# Patient Record
Sex: FEMALE | Race: WHITE | NOT HISPANIC OR LATINO | Employment: UNEMPLOYED | URBAN - NONMETROPOLITAN AREA
[De-identification: names, ages, dates, MRNs, and addresses within clinical notes are randomized per-mention and may not be internally consistent; named-entity substitution may affect disease eponyms.]

---

## 2017-02-06 ENCOUNTER — OFFICE VISIT (OUTPATIENT)
Dept: ENDOCRINOLOGY | Facility: CLINIC | Age: 47
End: 2017-02-06

## 2017-02-06 ENCOUNTER — APPOINTMENT (OUTPATIENT)
Dept: LAB | Facility: HOSPITAL | Age: 47
End: 2017-02-06

## 2017-02-06 VITALS
SYSTOLIC BLOOD PRESSURE: 164 MMHG | HEART RATE: 73 BPM | WEIGHT: 272 LBS | BODY MASS INDEX: 42.69 KG/M2 | HEIGHT: 67 IN | DIASTOLIC BLOOD PRESSURE: 98 MMHG

## 2017-02-06 DIAGNOSIS — E11.9 TYPE 2 DIABETES MELLITUS WITHOUT COMPLICATION, WITHOUT LONG-TERM CURRENT USE OF INSULIN (HCC): Primary | ICD-10-CM

## 2017-02-06 DIAGNOSIS — E05.00 GRAVES' DISEASE: ICD-10-CM

## 2017-02-06 DIAGNOSIS — I10 ESSENTIAL HYPERTENSION: ICD-10-CM

## 2017-02-06 LAB
ALBUMIN SERPL-MCNC: 4.3 G/DL (ref 3.4–4.8)
ALBUMIN/GLOB SERPL: 1 G/DL (ref 1.1–1.8)
ALP SERPL-CCNC: 133 U/L (ref 38–126)
ALT SERPL W P-5'-P-CCNC: 27 U/L (ref 9–52)
ANION GAP SERPL CALCULATED.3IONS-SCNC: 13 MMOL/L (ref 5–15)
AST SERPL-CCNC: 34 U/L (ref 14–36)
BASOPHILS # BLD AUTO: 0.09 10*3/MM3 (ref 0–0.2)
BASOPHILS NFR BLD AUTO: 1.6 % (ref 0–2)
BILIRUB SERPL-MCNC: 0.8 MG/DL (ref 0.2–1.3)
BUN BLD-MCNC: 8 MG/DL (ref 7–21)
BUN/CREAT SERPL: 14.5 (ref 7–25)
CALCIUM SPEC-SCNC: 9.2 MG/DL (ref 8.4–10.2)
CHLORIDE SERPL-SCNC: 98 MMOL/L (ref 95–110)
CO2 SERPL-SCNC: 30 MMOL/L (ref 22–31)
CREAT BLD-MCNC: 0.55 MG/DL (ref 0.5–1)
DEPRECATED RDW RBC AUTO: 43.3 FL (ref 36.4–46.3)
EOSINOPHIL # BLD AUTO: 0.1 10*3/MM3 (ref 0–0.7)
EOSINOPHIL NFR BLD AUTO: 1.8 % (ref 0–7)
ERYTHROCYTE [DISTWIDTH] IN BLOOD BY AUTOMATED COUNT: 14.2 % (ref 11.5–14.5)
GFR SERPL CREATININE-BSD FRML MDRD: 118 ML/MIN/1.73 (ref 58–135)
GLOBULIN UR ELPH-MCNC: 4.2 GM/DL (ref 2.3–3.5)
GLUCOSE BLD-MCNC: 199 MG/DL (ref 60–100)
HBA1C MFR BLD: 6.96 % (ref 4–5.6)
HCT VFR BLD AUTO: 40.7 % (ref 35–45)
HGB BLD-MCNC: 13.5 G/DL (ref 12–15.5)
IMM GRANULOCYTES # BLD: 0.01 10*3/MM3 (ref 0–0.02)
IMM GRANULOCYTES NFR BLD: 0.2 % (ref 0–0.5)
LYMPHOCYTES # BLD AUTO: 1.83 10*3/MM3 (ref 0.6–4.2)
LYMPHOCYTES NFR BLD AUTO: 32.1 % (ref 10–50)
MCH RBC QN AUTO: 28.2 PG (ref 26.5–34)
MCHC RBC AUTO-ENTMCNC: 33.2 G/DL (ref 31.4–36)
MCV RBC AUTO: 85.1 FL (ref 80–98)
MONOCYTES # BLD AUTO: 0.41 10*3/MM3 (ref 0–0.9)
MONOCYTES NFR BLD AUTO: 7.2 % (ref 0–12)
NEUTROPHILS # BLD AUTO: 3.26 10*3/MM3 (ref 2–8.6)
NEUTROPHILS NFR BLD AUTO: 57.1 % (ref 37–80)
PLATELET # BLD AUTO: 155 10*3/MM3 (ref 150–450)
PMV BLD AUTO: 10.4 FL (ref 8–12)
POTASSIUM BLD-SCNC: 4.5 MMOL/L (ref 3.5–5.1)
PROT SERPL-MCNC: 8.5 G/DL (ref 6.3–8.6)
RBC # BLD AUTO: 4.78 10*6/MM3 (ref 3.77–5.16)
SODIUM BLD-SCNC: 141 MMOL/L (ref 137–145)
T3 SERPL-MCNC: 207 NG/DL (ref 97–169)
T4 FREE SERPL-MCNC: 0.65 NG/DL (ref 0.78–2.19)
TSH SERPL DL<=0.05 MIU/L-ACNC: 0.02 MIU/ML (ref 0.46–4.68)
WBC NRBC COR # BLD: 5.7 10*3/MM3 (ref 3.2–9.8)

## 2017-02-06 PROCEDURE — 80050 GENERAL HEALTH PANEL: CPT | Performed by: NURSE PRACTITIONER

## 2017-02-06 PROCEDURE — 36415 COLL VENOUS BLD VENIPUNCTURE: CPT | Performed by: NURSE PRACTITIONER

## 2017-02-06 PROCEDURE — 83036 HEMOGLOBIN GLYCOSYLATED A1C: CPT | Performed by: NURSE PRACTITIONER

## 2017-02-06 PROCEDURE — 84439 ASSAY OF FREE THYROXINE: CPT | Performed by: NURSE PRACTITIONER

## 2017-02-06 PROCEDURE — 99214 OFFICE O/P EST MOD 30 MIN: CPT | Performed by: NURSE PRACTITIONER

## 2017-02-06 PROCEDURE — 84480 ASSAY TRIIODOTHYRONINE (T3): CPT | Performed by: NURSE PRACTITIONER

## 2017-02-06 NOTE — PROGRESS NOTES
Subjective    Christen Casillas is a 47 y.o. female. she is here today for follow-up.    History of Present Illness     Thyroid Problem            Hyperthyroidism from Graves' Disease      Duration diagnosed in June 2016        Timing - improving     Location-she has an asymmetric right more than left sided goiter board thyroid uptake and scan shows diffuse 60% uptake. This was  Reviewed by . . There is no thyroid ultrasound      Severity - at this point to mild     Complications - none        Current symptoms/problems  initially weight loss, fatigue, heat intolerance, increased bowel movements, tachycardia but now all improved     She also has goiter right, more than left. She states this has decreased in size after starting PTU     Alleviating Factors: Compliance with PTU at a dose of 100 mg 3 times daily and metoprolol at a dose of 25 mg twice daily.     Side Effects developed respiratory difficulties with Tapazole        ---     Social diagnoses of diabetes, new diagnosis June 2016 June 2016    HgbA1c 6.8%    Now on metformin BId     She does not know dosage            The following portions of the patient's history were reviewed and updated as appropriate:   Past Medical History   Diagnosis Date   • Graves disease 10/24/2016   • Hypertension 10/24/2016   • Type 2 diabetes mellitus without complication, without long-term current use of insulin 10/24/2016     History reviewed. No pertinent past surgical history.  Family History   Problem Relation Age of Onset   • Hypothyroidism Mother      OB History     No data available        Current Outpatient Prescriptions   Medication Sig Dispense Refill   • FLUoxetine (PROzac) 10 MG tablet      • gabapentin (NEURONTIN) 300 MG capsule      • lisinopril (PRINIVIL,ZESTRIL) 40 MG tablet      • metoprolol tartrate (LOPRESSOR) 25 MG tablet Take 2 tablets by mouth 2 (Two) Times a Day. 120 tablet 11   • ondansetron (ZOFRAN) 4 MG tablet      • propylthiouracil  (PTU) 50 MG tablet        No current facility-administered medications for this visit.      Allergies   Allergen Reactions   • Tapazole [Methimazole] Shortness Of Breath   • Aspirin Hives     also shortness of breath       Social History     Social History   • Marital status:      Spouse name: N/A   • Number of children: N/A   • Years of education: N/A     Social History Main Topics   • Smoking status: Former Smoker   • Smokeless tobacco: None   • Alcohol use None   • Drug use: None   • Sexual activity: Not Asked     Other Topics Concern   • None     Social History Narrative       Review of Systems  Review of Systems   Constitutional: Positive for fatigue. Negative for activity change, appetite change, chills, diaphoresis, fever and unexpected weight change.   HENT: Negative for congestion, dental problem, drooling, ear discharge, ear pain, facial swelling, sneezing, sore throat, tinnitus, trouble swallowing and voice change.    Eyes: Negative for photophobia, pain, discharge, redness, itching and visual disturbance.   Respiratory: Negative for apnea, cough, choking, chest tightness and shortness of breath.    Cardiovascular: Negative for chest pain, palpitations and leg swelling.   Gastrointestinal: Negative for abdominal distention, abdominal pain, constipation, diarrhea, nausea and vomiting.   Endocrine: Negative for cold intolerance, heat intolerance, polydipsia, polyphagia and polyuria.   Genitourinary: Negative for difficulty urinating, dysuria, frequency, hematuria and urgency.   Musculoskeletal: Negative for arthralgias, back pain, gait problem, joint swelling, myalgias, neck pain and neck stiffness.   Skin: Negative for color change, pallor, rash and wound.   Allergic/Immunologic: Negative for environmental allergies, food allergies and immunocompromised state.   Neurological: Negative for dizziness, tremors, facial asymmetry, weakness, light-headedness, numbness and headaches.   Hematological:  "Negative for adenopathy. Does not bruise/bleed easily.   Psychiatric/Behavioral: Negative for agitation, behavioral problems, confusion, decreased concentration, dysphoric mood and sleep disturbance.        Objective      Visit Vitals   • /98 (BP Location: Left arm, Patient Position: Sitting, Cuff Size: Adult)   • Pulse 73   • Ht 67\" (170.2 cm)   • Wt 272 lb (123 kg)   • BMI 42.6 kg/m2     Physical Exam   Constitutional: She is oriented to person, place, and time. She appears well-developed and well-nourished. No distress.   HENT:   Head: Normocephalic and atraumatic.   Right Ear: External ear normal.   Left Ear: External ear normal.   Nose: Nose normal.   Eyes: Conjunctivae and EOM are normal. Pupils are equal, round, and reactive to light.   Neck: Normal range of motion. Neck supple. No tracheal deviation present. Thyromegaly present.   assymetric goiter right greater than left   Cardiovascular: Normal rate, regular rhythm and normal heart sounds.    No murmur heard.  Pulmonary/Chest: Effort normal and breath sounds normal. No respiratory distress. She has no wheezes.   Abdominal: Soft. Bowel sounds are normal. There is no tenderness. There is no rebound and no guarding.   Musculoskeletal: Normal range of motion. She exhibits no edema, tenderness or deformity.   Neurological: She is alert and oriented to person, place, and time. No cranial nerve deficit.   Skin: Skin is warm and dry. No rash noted.   Psychiatric: She has a normal mood and affect. Her behavior is normal. Judgment and thought content normal.       Lab Review  GLUCOSE   Date Value   02/06/2017 199 mg/dL (H)   10/24/2016 133 mg/dl (H)   09/19/2016 135 mg/dl (H)   05/26/2016 143 mg/dl (H)     SODIUM (mmol/L)   Date Value   02/06/2017 141   10/24/2016 138   09/19/2016 140   05/26/2016 141     POTASSIUM (mmol/L)   Date Value   02/06/2017 4.5   10/24/2016 4.7   09/19/2016 3.8   05/26/2016 4.7     CHLORIDE (mmol/L)   Date Value   02/06/2017 98 "   10/24/2016 97   09/19/2016 100   05/26/2016 100     CO2 (mmol/L)   Date Value   02/06/2017 30.0   10/24/2016 31   09/19/2016 29   05/26/2016 23     BUN   Date Value   02/06/2017 8 mg/dL   10/24/2016 8 mg/dl   09/19/2016 11 mg/dl   05/26/2016 13 mg/dl     CREATININE   Date Value   02/06/2017 0.55 mg/dL   10/24/2016 0.7 mg/dl   09/19/2016 0.6 mg/dl   05/26/2016 0.4 mg/dl (L)     HEMOGLOBIN A1C   Date Value   02/06/2017 6.96 % (H)   06/07/2016 6.8 %TotHgb (H)     TRIGLYCERIDES (mg/dl)   Date Value   06/07/2016 128       Assessment/Plan      1. Type 2 diabetes mellitus without complication, without long-term current use of insulin    2. Graves' disease    3. Essential hypertension    .    Medications prescribed:  Outpatient Encounter Prescriptions as of 2/6/2017   Medication Sig Dispense Refill   • FLUoxetine (PROzac) 10 MG tablet      • gabapentin (NEURONTIN) 300 MG capsule      • lisinopril (PRINIVIL,ZESTRIL) 40 MG tablet      • metoprolol tartrate (LOPRESSOR) 25 MG tablet Take 2 tablets by mouth 2 (Two) Times a Day. 120 tablet 11   • ondansetron (ZOFRAN) 4 MG tablet      • propylthiouracil (PTU) 50 MG tablet        No facility-administered encounter medications on file as of 2/6/2017.        Orders placed during this encounter include:  Orders Placed This Encounter   Procedures   • Comprehensive Metabolic Panel   • TSH   • T4, Free   • T3   • Hemoglobin A1c   • CBC Auto Differential                 Lipid Management  June 2016     Sum Total LDL-Chol 0 - 129 mg/dl 69   Tg nl      Reevaluate with nl Thyroid                       Blood Pressure Management:   Nl on lisinopril  Bronchospasm w metoprolol         Microvascular Complication Monitoring:     Neuropathy - on Neurontin 300 mg      --     Immunizations:           Preventive Care:      Non smoker     Weight Related:   Advised to consume 500 calories less per day  Exercise 30 min daily      Bone Health           Lab Results   Component Value Date     CALCIUM 9.6  09/19/2016         Thyroid Health        Lab Results   Component Value Date     TSH <0.01 (L) 08/16/2016      Graves Disease        Component  Latest Ref Rng 6/7/2016 8/16/2016   TSH Baseline  0.46 - 4.68 uIU/ml <0.01 (L) <0.01 (L)   Free T4  0.78 - 2.19 ng/dl   5.69 (H)   T3, Total  97 - 169 ng/dl   781 (H)   Thyrotropin Receptor Antibody  0.00 - 1.75 IU/L   11.71 (H)       dx is Graves' disease      PTU 2 tabs three times daily     Labs from Oct. 2016    TSH - <0.01  Free T4- 1.01  T3- 209    Decreased PTU to 2 tablets BID      Continue metopolol 25 mg twice daily     Need new labs today and will call      Schedule for Radioactive Iodine. After Radioactive Iodine is given there is a transient worsening phase so 5 days after CAPELLAN restart PTU for about 2 weeks then stop      We will check levels 4 weeks after CAPELLAN ,  will leave a lab order     Don't stop metoprolol unless heart rate drops less than 60   On the contrary if heart rate speeds up to more than 100 while resting - double it .      will make appt w Dr. Vela and with us in about 8 weeks.     Other Diabetes Related Aspects         No results found for: [id number]            Glycemic Management    Now on metformin ---- start B12 in the formal multivitamins daily     Has neuropathy and this could be related to either thyroid, diabetes or b12 Deficiency.     She is taking Neurontin              4. Follow-up: Return in about 3 months (around 5/6/2017) for Recheck.

## 2017-02-07 ENCOUNTER — TELEPHONE (OUTPATIENT)
Dept: ENDOCRINOLOGY | Facility: CLINIC | Age: 47
End: 2017-02-07

## 2017-02-07 RX ORDER — PROPYLTHIOURACIL 50 MG/1
50 TABLET ORAL 3 TIMES DAILY
Qty: 90 TABLET | Refills: 5 | Status: SHIPPED | OUTPATIENT
Start: 2017-02-07 | End: 2017-03-29 | Stop reason: ALTCHOICE

## 2017-02-07 NOTE — TELEPHONE ENCOUNTER
----- Message from NYLA Gibbs sent at 2/7/2017  1:46 PM CST -----  Call patient with result- let her know still hyperthyroid but the free T4 is now low so ask her to take the PTU 1 tablet 3 times a day

## 2017-02-13 ENCOUNTER — HOSPITAL ENCOUNTER (OUTPATIENT)
Dept: RADIATION ONCOLOGY | Facility: HOSPITAL | Age: 47
Setting detail: RADIATION/ONCOLOGY SERIES
End: 2017-02-13

## 2017-02-13 ENCOUNTER — OFFICE VISIT (OUTPATIENT)
Dept: RADIATION ONCOLOGY | Facility: HOSPITAL | Age: 47
End: 2017-02-13

## 2017-02-13 VITALS
TEMPERATURE: 97 F | BODY MASS INDEX: 42.31 KG/M2 | OXYGEN SATURATION: 95 % | RESPIRATION RATE: 20 BRPM | DIASTOLIC BLOOD PRESSURE: 72 MMHG | SYSTOLIC BLOOD PRESSURE: 167 MMHG | HEART RATE: 68 BPM | WEIGHT: 269.6 LBS | HEIGHT: 67 IN

## 2017-02-13 DIAGNOSIS — E05.00 GRAVES' DISEASE: Primary | ICD-10-CM

## 2017-02-13 PROCEDURE — G0463 HOSPITAL OUTPT CLINIC VISIT: HCPCS | Performed by: RADIOLOGY

## 2017-02-13 PROCEDURE — 99204 OFFICE O/P NEW MOD 45 MIN: CPT | Performed by: RADIOLOGY

## 2017-02-13 NOTE — PROGRESS NOTES
New Patient Visit       Patient: Christen Casillas   YOB: 1970   Medical Record Number: [id number]   Date of Visit  February 13, 2017   Primary Diagnosis:  Hyperthyroidism secondary to Graves’ disease [E05.90]                                              History of Present Illness: Ms. Christen Casillas is a 47-year-old white female with a history of hyperthyroidism secondary to Graves’ disease.  Ms. Casillas initially presented to the [facility] on 5/26/16 complaining of a 3 week history of nausea, dizziness, increased anxiety, and a recent 35 pound weight loss.  She also noted that she had recently developed a nodule in her right neck.  She was referred to the [facility].  No specific cause for her symptoms was identified and she was scheduled to follow-up with the [facility], which she did on 6/7/16.  Blood work at that time revealed a TSH of <0.01.  A thyroid uptake and scan on 6/30/16 revealed homogeneous uptake of radioactive tracer within the thyroid gland, with no hot or cold nodules identified, diffuse enlargement of both lobes of the thyroid and isthmus (significantly worse on the right) and 24-hour uptake of 61.2% (normal 10-30%), all consistent with hyperthyroidism.  The patient was subsequently started on methimazole and metoprolol.  Repeat blood work on 8/16/16 revealed a TSH of <0.01, a T3 of 781 ng/dl (97-1 69) and a T4 of 5.69 ng/dl (0.78-2.19).  The patient was unable to tolerate methimazole due to respiratory distress and was switched to propylthiouracil (PTU).  The patient was referred to Dr. Messina and was seen in his clinic on 10/24/16.  At that time, labwork revealed a TSH of <0.01, a T3 of 209, and a T4 of 1.01.  Her thyroid stimulating immunoglobulin was 213 (0-139) and her thyroid peroxidase was 149 (0-34), both indicative of Graves’ disease.  Her PTU was changed from 2 tablets 3 times a day to 2 tablets twice a day and she was referred for radioactive iodine  ablation.  She never received that appointment, however, and was seen by NYLA Mondragon, in follow-up on 2/6/17.  Blood work revealed a TSH of 0.20, a T3 of 207.0, and a T4 of 0.65.  Her PTU was decreased to 1 tablet 3 times a day and she has been referred to our clinic to discuss radioactive iodine ablation of her thyroid.    (Old medical records were requested, reviewed, and summarized in the HPI)    Review of Systems   Constitutional: Positive for fatigue.   Skin:        Hair loss       The remainder of her review of systems is otherwise negative.    Past Medical History   Diagnosis Date   • Graves disease 10/24/2016   • Hypertension 10/24/2016   • Type 2 diabetes mellitus without complication, without long-term current use of insulin 10/24/2016        Past Surgical History   Procedure Laterality Date   • Exploratory laparotomy     • Tympanoplasty Left       Family History   Problem Relation Age of Onset   • Hypothyroidism Mother    • Skin cancer Mother    • Stomach cancer Brother    • Stomach cancer Maternal Aunt    • Breast cancer Paternal Grandmother         Social History     Social History   • Marital status:      Spouse name: Timbo   • Number of children: 0   • Years of education: 12     Occupational History   • food prep      Social History Main Topics   • Smoking status: Former Smoker     Packs/day: 0.50     Years: 31.00     Types: Cigarettes     Quit date: 09/2011   • Smokeless tobacco: Never Used   • Alcohol use No   • Drug use: No   • Sexual activity: No     Other Topics Concern   • Not on file     Social History Narrative        Honey bee treatment [bee venom]; Tapazole [methimazole]; and Aspirin   Prior to Admission medications    Medication Sig Start Date End Date Taking? Authorizing Provider   FLUoxetine (PROzac) 10 MG tablet  10/11/16  Yes Historical Provider, MD   gabapentin (NEURONTIN) 300 MG capsule  10/14/16  Yes Historical Provider, MD   lisinopril (PRINIVIL,ZESTRIL) 40 MG tablet   "10/11/16  Yes Historical Provider, MD   metoprolol tartrate (LOPRESSOR) 25 MG tablet Take 2 tablets by mouth 2 (Two) Times a Day. 10/24/16  Yes Gabino Martinez MD   ondansetron (ZOFRAN) 4 MG tablet  10/11/16  Yes Historical Provider, MD   propylthiouracil (PTU) 50 MG tablet  10/11/16  Yes Historical Provider, MD   propylthiouracil (PTU) 50 MG tablet Take 1 tablet by mouth 3 (Three) Times a Day. 2/7/17 2/7/18 Yes NYLA Gibbs      Pain: (on a scale of 0-10)     Pain Score    02/13/17 1054   PainSc: 0-No pain       Quality of Life:  100 - Full Activity  Advanced Care Plan: N     Physical Examination:  Vitals:     Vitals:    02/13/17 1054   BP: 167/72   Pulse: 68   Resp: 20   Temp: 97 °F (36.1 °C)   SpO2: 95%       Height: 67\" (170.2 cm)  Weight: Weight: 269 lb 9.6 oz (122 kg)   Body mass index is 42.23 kg/(m^2).      Constitutional: The patient is a well-developed, well-nourished white female in no acute distress.  Alert and oriented ×3.  Eyes: PERRLA.  EOMI.  ENMT:  Ears and nose WNL.  No lesions noted in the oral cavity or oropharynx. Poor dentition is noted.  A large goiter is noted in the right neck.    Lymphatics: No cervical, supraclavicular, or  Axillary lymphadenopathy is palpated.  CV: Regular rate and rhythm.  No murmurs, rubs, or gallops are appreciated.  Respiratory: Lungs clear to auscultation.  Breath sounds equal bilaterally.  GI: Abdomen soft, nontender, nondistended, with no hepatosplenomegaly or masses palpated.  Extremities: No clubbing, cyanosis, or edema.  Neurologic: Cranial nerves II through XII are grossly intact, with no focal neurological deficits noted on exam.  Psychiatric: Alert and oriented x3. Normal affect, with no anxiety or depression noted.    Radiographs  thyroid uptake and scan as discussed in the HPI.    Pathology: No biopsy performed  Labs:     Date TSH  (uIU/ml) T3  ( ng/dl) T4  (0.78-2.19 ng/dl)   6/7/2016 <0.01     8/16/2016 <0.01 781 5.69 "   10/24/2016 <0.01 209 1.01   2/6/2017 0.2 207 0.65        WBC   Date Value Ref Range Status   02/06/2017 5.70 3.20 - 9.80 10*3/mm3 Final   10/24/2016 6.9 3.2 - 9.8 x1000/uL Final     HEMOGLOBIN   Date Value Ref Range Status   02/06/2017 13.5 12.0 - 15.5 g/dL Final   10/24/2016 13.3 12.0 - 15.5 gm/dl Final     HEMATOCRIT   Date Value Ref Range Status   02/06/2017 40.7 35.0 - 45.0 % Final   10/24/2016 42.0 35.0 - 45.0 % Final     PLATELETS   Date Value Ref Range Status   02/06/2017 155 150 - 450 10*3/mm3 Final   10/24/2016 146 (L) 150 - 450 x1000/mm3 Final        ASSESSMENT/PLAN: Ms. Christen Casillas is a 47-year-old white female with a history of hyperthyroidism secondary to Graves’ disease.  Treatment options were discussed with the patient and her mother-in-law, including medical management, surgery, and radioactive iodine ablation.  I feel that she could benefit from a course of I-131 ablation of the thyroid to help with control of her disease.  The risks, benefits, and rationale for the treatment of hyperthyroidism with I-131 were discussed in detail.  The patient and her mother-in-law voice understanding and agree to proceed with therapy.  We will schedule I-131 ablation in the near future and coordinate follow-up with Dr. Messina.      Sincerely,    Rui Vela MD  Radiation Oncology    Electronically signed byKatelin Walls RN 2/13/2017 11:03 AM     Cc:  Dr. Siddharth Martinez/NYLA Mondragon Dr.

## 2017-02-23 ENCOUNTER — TRANSCRIBE ORDERS (OUTPATIENT)
Dept: RADIATION ONCOLOGY | Facility: HOSPITAL | Age: 47
End: 2017-02-23

## 2017-02-23 ENCOUNTER — APPOINTMENT (OUTPATIENT)
Dept: LAB | Facility: HOSPITAL | Age: 47
End: 2017-02-23

## 2017-02-23 ENCOUNTER — HOSPITAL ENCOUNTER (OUTPATIENT)
Dept: NUCLEAR MEDICINE | Facility: HOSPITAL | Age: 47
Discharge: HOME | End: 2017-02-23

## 2017-02-23 DIAGNOSIS — Z33.1 PREGNANCY AS INCIDENTAL FINDING: Primary | ICD-10-CM

## 2017-02-23 LAB — B-HCG UR QL: NEGATIVE

## 2017-02-23 PROCEDURE — 79005 NUCLEAR RX ORAL ADMIN: CPT

## 2017-02-23 PROCEDURE — 81025 URINE PREGNANCY TEST: CPT | Performed by: RADIOLOGY

## 2017-02-23 PROCEDURE — A9517 I131 IODIDE CAP, RX: HCPCS | Performed by: RADIOLOGY

## 2017-02-23 PROCEDURE — 77261 THER RADIOLOGY TX PLNG SMPL: CPT | Performed by: RADIOLOGY

## 2017-02-23 PROCEDURE — 79005 NUCLEAR RX ORAL ADMIN: CPT | Performed by: RADIOLOGY

## 2017-02-23 PROCEDURE — 0 SODIUM IODIDE CAPSULE: Performed by: RADIOLOGY

## 2017-02-23 RX ADMIN — SODIUM IODIDE I 131 1 CAPSULE: 100 CAPSULE ORAL at 13:20

## 2017-03-15 ENCOUNTER — OFFICE VISIT (OUTPATIENT)
Dept: ENDOCRINOLOGY | Facility: CLINIC | Age: 47
End: 2017-03-15

## 2017-03-15 VITALS
SYSTOLIC BLOOD PRESSURE: 132 MMHG | HEIGHT: 67 IN | HEART RATE: 75 BPM | BODY MASS INDEX: 43.79 KG/M2 | WEIGHT: 279 LBS | DIASTOLIC BLOOD PRESSURE: 84 MMHG

## 2017-03-15 DIAGNOSIS — E11.9 TYPE 2 DIABETES MELLITUS WITHOUT COMPLICATION, WITHOUT LONG-TERM CURRENT USE OF INSULIN (HCC): ICD-10-CM

## 2017-03-15 DIAGNOSIS — I10 ESSENTIAL HYPERTENSION: ICD-10-CM

## 2017-03-15 DIAGNOSIS — E05.00 GRAVES DISEASE: Primary | ICD-10-CM

## 2017-03-15 PROCEDURE — 99214 OFFICE O/P EST MOD 30 MIN: CPT | Performed by: NURSE PRACTITIONER

## 2017-03-15 NOTE — PROGRESS NOTES
Subjective    Christen Casillas is a 47 y.o. female. she is here today for follow-up.    History of Present Illness         Thyroid Problem            Hyperthyroidism from Graves' Disease     CAPELLAN ablation on Feb. 23, 2017     Duration diagnosed in June 2016        Timing - improving     Location-she has an asymmetric right more than left sided goiter board thyroid uptake and scan shows diffuse 60% uptake. This was Reviewed by . . There is no thyroid ultrasound        Severity - at this point to mild     Complications - none        Current symptoms/problems  initially weight loss, fatigue, heat intolerance, increased bowel movements, tachycardia but now all improved     She also has goiter right, more than left. She states this has decreased in size after starting PTU     Alleviating Factors: Compliance with PTU at a dose of 100 mg 3 times daily and metoprolol at a dose of 25 mg twice daily.     Side Effects developed respiratory difficulties with Tapazole        ---     Social diagnoses of diabetes, new diagnosis June 2016 June 2016     HgbA1c 6.8%     Now on metformin BId      She does not know dosage         Evaluation history:    Current medications:  Current Outpatient Prescriptions   Medication Sig Dispense Refill   • FLUoxetine (PROzac) 10 MG tablet      • gabapentin (NEURONTIN) 300 MG capsule      • lisinopril (PRINIVIL,ZESTRIL) 40 MG tablet      • metoprolol tartrate (LOPRESSOR) 25 MG tablet Take 2 tablets by mouth 2 (Two) Times a Day. 120 tablet 11   • ondansetron (ZOFRAN) 4 MG tablet      • propylthiouracil (PTU) 50 MG tablet      • propylthiouracil (PTU) 50 MG tablet Take 1 tablet by mouth 3 (Three) Times a Day. 90 tablet 5     No current facility-administered medications for this visit.        The following portions of the patient's history were reviewed and updated as appropriate:   Past Medical History   Diagnosis Date   • Graves disease 10/24/2016   • Hypertension 10/24/2016   •  Type 2 diabetes mellitus without complication, without long-term current use of insulin 10/24/2016     Past Surgical History   Procedure Laterality Date   • Exploratory laparotomy     • Tympanoplasty Left      Family History   Problem Relation Age of Onset   • Hypothyroidism Mother    • Skin cancer Mother    • Stomach cancer Brother    • Stomach cancer Maternal Aunt    • Breast cancer Paternal Grandmother      OB History      Para Term  AB TAB SAB Ectopic Multiple Living    0 0 0 0 0 0 0 0 0 0        Allergies   Allergen Reactions   • Honey Bee Treatment [Bee Venom] Anaphylaxis   • Tapazole [Methimazole] Shortness Of Breath   • Aspirin Hives     also shortness of breath       Social History     Social History   • Marital status:      Spouse name: Timbo   • Number of children: 0   • Years of education: 12     Occupational History   • food prep      Social History Main Topics   • Smoking status: Former Smoker     Packs/day: 0.50     Years: 31.00     Types: Cigarettes     Quit date: 2011   • Smokeless tobacco: Never Used   • Alcohol use No   • Drug use: No   • Sexual activity: No     Other Topics Concern   • None     Social History Narrative       Review of Systems  Review of Systems   Constitutional: Negative for activity change, appetite change, chills, diaphoresis and fatigue.   HENT: Negative for congestion, dental problem, drooling, ear discharge, ear pain, facial swelling, sneezing, sore throat, tinnitus, trouble swallowing and voice change.    Eyes: Negative for photophobia, pain, discharge, redness, itching and visual disturbance.   Respiratory: Negative for apnea, cough, choking, chest tightness and shortness of breath.    Cardiovascular: Negative for chest pain, palpitations and leg swelling.   Gastrointestinal: Negative for abdominal distention, abdominal pain, constipation, diarrhea, nausea and vomiting.   Endocrine: Negative for cold intolerance, heat intolerance, polydipsia,  "polyphagia and polyuria.   Genitourinary: Negative for difficulty urinating, dysuria, frequency, hematuria and urgency.   Musculoskeletal: Negative for arthralgias, back pain, gait problem, joint swelling, myalgias, neck pain and neck stiffness.   Skin: Negative for color change, pallor, rash and wound.   Allergic/Immunologic: Negative for environmental allergies, food allergies and immunocompromised state.   Neurological: Negative for dizziness, tremors, facial asymmetry, weakness, light-headedness, numbness and headaches.   Hematological: Negative for adenopathy. Does not bruise/bleed easily.   Psychiatric/Behavioral: Negative for agitation, behavioral problems, confusion, decreased concentration and sleep disturbance.         Objective      Visit Vitals   • /84   • Pulse 75   • Ht 67\" (170.2 cm)   • Wt 279 lb (127 kg)   • BMI 43.7 kg/m2     Physical Exam   Constitutional: She is oriented to person, place, and time. She appears well-developed and well-nourished. No distress.   HENT:   Head: Normocephalic and atraumatic.   Right Ear: External ear normal.   Left Ear: External ear normal.   Nose: Nose normal.   Eyes: Conjunctivae and EOM are normal. Pupils are equal, round, and reactive to light.   Neck: Normal range of motion. Neck supple. No tracheal deviation present. No thyromegaly present.   Thyroid enlargement right > left   Cardiovascular: Normal rate, regular rhythm and normal heart sounds.    No murmur heard.  Pulmonary/Chest: Effort normal and breath sounds normal. No respiratory distress. She has no wheezes.   Abdominal: Soft. Bowel sounds are normal. There is no tenderness. There is no rebound and no guarding.   Musculoskeletal: Normal range of motion. She exhibits no edema, tenderness or deformity.   Neurological: She is alert and oriented to person, place, and time. No cranial nerve deficit.   Skin: Skin is warm and dry. No rash noted.   Psychiatric: She has a normal mood and affect. Her behavior " is normal. Judgment and thought content normal.       Lab Review  Lab Results   Component Value Date    TSH 0.020 (L) 02/06/2017    TSH <0.01 (L) 10/24/2016     Lab Results   Component Value Date    FREET4 0.65 (L) 02/06/2017    FREET4 1.01 10/24/2016        Assessment/Plan      1. Graves disease    2. Type 2 diabetes mellitus without complication, without long-term current use of insulin    3. Essential hypertension    . This diagnosis was discussed and reviewed with the patient including the advantages of drug therapy, radioactive iodine and surgery.     1. Orders placed during this encounter include:  Orders Placed This Encounter   Procedures   • Comprehensive Metabolic Panel   • TSH   • T4, Free   • T3       2. Medications prescribed:  Outpatient Encounter Prescriptions as of 3/15/2017   Medication Sig Dispense Refill   • FLUoxetine (PROzac) 10 MG tablet      • gabapentin (NEURONTIN) 300 MG capsule      • lisinopril (PRINIVIL,ZESTRIL) 40 MG tablet      • metoprolol tartrate (LOPRESSOR) 25 MG tablet Take 2 tablets by mouth 2 (Two) Times a Day. 120 tablet 11   • ondansetron (ZOFRAN) 4 MG tablet      • propylthiouracil (PTU) 50 MG tablet      • propylthiouracil (PTU) 50 MG tablet Take 1 tablet by mouth 3 (Three) Times a Day. 90 tablet 5     No facility-administered encounter medications on file as of 3/15/2017.            Lipid Management  June 2016     Sum Total LDL-Chol 0 - 129 mg/dl 69   Tg nl      Reevaluate with nl Thyroid                        Blood Pressure Management:   Nl on lisinopril  Bronchospasm w metoprolol         Microvascular Complication Monitoring:      Neuropathy - on Neurontin 300 mg      --     Immunizations:            Preventive Care:      Non smoker     Weight Related:   Advised to consume 500 calories less per day  Exercise 30 min daily      Bone Health               Lab Results   Component Value Date     CALCIUM 9.6 09/19/2016         Thyroid Health            Lab Results   Component  Value Date     TSH <0.01 (L) 08/16/2016      Graves Disease        Component  Latest Ref Rng 6/7/2016 8/16/2016   TSH Baseline  0.46 - 4.68 uIU/ml <0.01 (L) <0.01 (L)   Free T4  0.78 - 2.19 ng/dl   5.69 (H)   T3, Total  97 - 169 ng/dl   781 (H)   Thyrotropin Receptor Antibody  0.00 - 1.75 IU/L   11.71 (H)      dx is Graves' disease     PTU 2 tabs three times daily      Labs from Oct. 2016     TSH - <0.01  Free T4- 1.01  T3- 209     Decreased PTU to 2 tablets BID --now one TID      Continue metopolol 25 mg twice daily      Need new labs today and will call      Schedule for Radioactive Iodine. After Radioactive Iodine is given there is a transient worsening phase so 5 days after CAPELLAN restart PTU for about 2 weeks then stop      We will check levels 4 weeks after CAPELLAN , will leave a lab order     Don't stop metoprolol unless heart rate drops less than 60   On the contrary if heart rate speeds up to more than 100 while resting - double it .     will make appt w Dr. Vela and with us in about 8 weeks.    CAPELLAN ablation on Feb. 23, 2017     She started back on the PTU on March 1, 2017 will stop tomorrow    Check labs in 2 weeks      Other Diabetes Related Aspects         No results found for: [id number]            Glycemic Management     Now on metformin ---- start B12 in the formal multivitamins daily     Has neuropathy and this could be related to either thyroid, diabetes or b12 Deficiency.     She is taking Neurontin                    4. Follow up: Return in about 6 weeks (around 4/26/2017).

## 2017-03-29 ENCOUNTER — CONSULT (OUTPATIENT)
Dept: SURGERY | Facility: CLINIC | Age: 47
End: 2017-03-29

## 2017-03-29 VITALS
SYSTOLIC BLOOD PRESSURE: 144 MMHG | DIASTOLIC BLOOD PRESSURE: 60 MMHG | HEIGHT: 67 IN | BODY MASS INDEX: 43.79 KG/M2 | WEIGHT: 279 LBS

## 2017-03-29 DIAGNOSIS — K42.9 UMBILICAL HERNIA WITHOUT OBSTRUCTION AND WITHOUT GANGRENE: Primary | ICD-10-CM

## 2017-03-29 PROCEDURE — 99204 OFFICE O/P NEW MOD 45 MIN: CPT | Performed by: SURGERY

## 2017-03-29 RX ORDER — AMLODIPINE BESYLATE 10 MG/1
10 TABLET ORAL NIGHTLY
COMMUNITY
Start: 2017-02-16 | End: 2017-08-29 | Stop reason: SDUPTHER

## 2017-03-29 RX ORDER — DIPHENHYDRAMINE HCL 25 MG
25 CAPSULE ORAL EVERY 6 HOURS PRN
COMMUNITY

## 2017-03-29 NOTE — PROGRESS NOTES
Subjective   Christen Casillas is a 47 y.o. female     History of Present Illness referred by Dr. Bartholomew for symptomatically umbilical hernia.  Patient describes approximately 2 week history of periumbilical pain.  She's had a known umbilical hernia for a while now but seems to gotten larger and more tender for her.  She has had some nausea but no vomiting.  She has daily bowel movements.  She did have a laparoscopic procedure which they put a port at the lower part of her umbilicus for GYN laparoscopy no other surgeries done.  No history of hernias in other sites        Review of Systems   Constitutional: Positive for fever.   Gastrointestinal: Positive for abdominal pain and nausea.        Mid Abdominal Pain   Musculoskeletal: Positive for back pain.        Bilateral Leg & Foot Pain   All other systems reviewed and are negative.    Past Medical History:   Diagnosis Date   • Graves disease 10/24/2016   • Hypertension 10/24/2016   • Type 2 diabetes mellitus without complication, without long-term current use of insulin 10/24/2016     Past Surgical History:   Procedure Laterality Date   • EXPLORATORY LAPAROTOMY     • TYMPANOPLASTY Left      Family History   Problem Relation Age of Onset   • Hypothyroidism Mother    • Skin cancer Mother    • Stomach cancer Brother    • Stomach cancer Maternal Aunt    • Breast cancer Paternal Grandmother      Social History     Social History   • Marital status:      Spouse name: Timbo   • Number of children: 0   • Years of education: 12     Occupational History   • food prep      Social History Main Topics   • Smoking status: Former Smoker     Packs/day: 0.50     Years: 31.00     Types: Cigarettes     Quit date: 09/2011   • Smokeless tobacco: Never Used   • Alcohol use No   • Drug use: No   • Sexual activity: No     Other Topics Concern   • Not on file     Social History Narrative       Objective   Physical Exam   Constitutional: She is oriented to person, place, and  time. She appears well-developed and well-nourished. No distress (obese white female).   HENT:   Head: Normocephalic and atraumatic.   Nose: Nose normal.   Eyes: Conjunctivae are normal. No scleral icterus.   Neck: Normal range of motion. No tracheal deviation present. No thyromegaly present.   Cardiovascular: Normal rate, regular rhythm and normal heart sounds.    No murmur heard.  Pulmonary/Chest: Effort normal and breath sounds normal. No respiratory distress. She has no wheezes. She has no rales. She exhibits no tenderness.   Abdominal: Soft. She exhibits no distension. There is no tenderness. There is no rebound and no guarding. A hernia (easily reducible umbilical hernia with tenderness around the umbilical area.  Able to be partially reduced at least not completely No redness no skin breakdown) is present.   Musculoskeletal: She exhibits no tenderness or deformity.   Neurological: She is alert and oriented to person, place, and time.   Skin: Skin is warm and dry. No rash noted.   Psychiatric: She has a normal mood and affect. Her behavior is normal. Judgment and thought content normal.   Vitals reviewed.      Assessment/Plan  symptomatically umbilical hernia.  I recommend open repair this.  I fully discussed the procedure alternatives risk and benefits with the patient she clearly understands and wishes to proceed.  She understands and will likely use mesh for the repair.  She also understands the less likely chance that there is intestines involved in mid to be addressed as well.  Clinically however I do not think that's can be the case but we do not have a preoperative CT scan to confirm that.  I would not recommend a CT scan at this point as well.      The encounter diagnosis was Umbilical hernia without obstruction and without gangrene.

## 2017-03-30 ENCOUNTER — ANESTHESIA EVENT (OUTPATIENT)
Dept: PERIOP | Facility: HOSPITAL | Age: 47
End: 2017-03-30

## 2017-03-30 ENCOUNTER — APPOINTMENT (OUTPATIENT)
Dept: PREADMISSION TESTING | Facility: HOSPITAL | Age: 47
End: 2017-03-30

## 2017-03-30 VITALS
RESPIRATION RATE: 20 BRPM | OXYGEN SATURATION: 98 % | DIASTOLIC BLOOD PRESSURE: 78 MMHG | WEIGHT: 276 LBS | BODY MASS INDEX: 43.32 KG/M2 | HEART RATE: 82 BPM | HEIGHT: 67 IN | SYSTOLIC BLOOD PRESSURE: 150 MMHG

## 2017-03-30 LAB
ANION GAP SERPL CALCULATED.3IONS-SCNC: 10 MMOL/L (ref 5–15)
BUN BLD-MCNC: 10 MG/DL (ref 7–21)
BUN/CREAT SERPL: 19.2 (ref 7–25)
CALCIUM SPEC-SCNC: 8.9 MG/DL (ref 8.4–10.2)
CHLORIDE SERPL-SCNC: 100 MMOL/L (ref 95–110)
CO2 SERPL-SCNC: 29 MMOL/L (ref 22–31)
CREAT BLD-MCNC: 0.52 MG/DL (ref 0.5–1)
GFR SERPL CREATININE-BSD FRML MDRD: 126 ML/MIN/1.73 (ref 60–135)
GLUCOSE BLD-MCNC: 176 MG/DL (ref 60–100)
HCG SERPL QL: NEGATIVE
MRSA DNA SPEC QL NAA+PROBE: NEGATIVE
POTASSIUM BLD-SCNC: 4.3 MMOL/L (ref 3.5–5.1)
SODIUM BLD-SCNC: 139 MMOL/L (ref 137–145)

## 2017-03-30 PROCEDURE — 87641 MR-STAPH DNA AMP PROBE: CPT | Performed by: SURGERY

## 2017-03-30 PROCEDURE — 80048 BASIC METABOLIC PNL TOTAL CA: CPT | Performed by: NURSE PRACTITIONER

## 2017-03-30 PROCEDURE — 93005 ELECTROCARDIOGRAM TRACING: CPT

## 2017-03-30 PROCEDURE — 84703 CHORIONIC GONADOTROPIN ASSAY: CPT | Performed by: ANESTHESIOLOGY

## 2017-03-30 PROCEDURE — 93010 ELECTROCARDIOGRAM REPORT: CPT | Performed by: INTERNAL MEDICINE

## 2017-03-30 PROCEDURE — 36415 COLL VENOUS BLD VENIPUNCTURE: CPT

## 2017-03-30 RX ORDER — BUDESONIDE AND FORMOTEROL FUMARATE DIHYDRATE 160; 4.5 UG/1; UG/1
2 AEROSOL RESPIRATORY (INHALATION) 2 TIMES DAILY PRN
COMMUNITY

## 2017-03-30 RX ORDER — ACETAMINOPHEN 500 MG
500 TABLET ORAL EVERY 6 HOURS PRN
COMMUNITY

## 2017-03-30 RX ORDER — SODIUM CHLORIDE 9 MG/ML
1000 INJECTION, SOLUTION INTRAVENOUS CONTINUOUS PRN
Status: CANCELLED | OUTPATIENT
Start: 2017-03-30

## 2017-03-30 RX ORDER — ALBUTEROL SULFATE 2.5 MG/3ML
2.5 SOLUTION RESPIRATORY (INHALATION) EVERY 4 HOURS PRN
COMMUNITY

## 2017-03-30 NOTE — DISCHARGE INSTRUCTIONS
[facility]  Pre-op Information and Guidelines    You will be called after 2 p.m. the day before your surgery (Friday for Monday surgery) and notified of your time for arrival and approximate surgery time.  If you have not received a call by 4P.M., please contact Same Day Surgery at [phone] of if outside [facility] call [phone].    Please Follow these Important Safety Guidelines:    • The morning of your procedure, take only the medications listed below with   A sip of water:_____________________________________________       ______________________________________________    • DO NOT eat or drink anything after 12:00 midnight the night before surgery  Specific instructions concerning drinking clear liquids will be discussed during  the pre-surgery instruction call the day before your surgery.    • If you take a blood thinner (ex. Plavix, Coumadin, aspirin), ask your doctor when to stop it before surgery  STOP DATE: _________________    • Only 2 visitors are allowed in patient rooms at a time  Your visitors will be asked to wait in the lobby until the admission process is complete with the exception of a parent with a child and patients in need of special assistance.    • YOU CANNOT DRIVE YOURSELF HOME  You must be accompanied by someone who will be responsible for driving you home after surgery and for your care at home.    • DO NOT chew gum, use breath mints, hard candy, or smoke the day of surgery  • DO NOT drink alcohol for at least 24 hours before your surgery  • DO NOT wear any jewelry and remove all body piercing before coming to the hospital  • DO NOT wear make-up to the hospital  • If you are having surgery on an extremity (arm/leg/foot) remove nail polish/artificial nails on the surgical side  • Clothing, glasses, contacts, dentures, and hairpieces must be removed before surgery  • Bathe the night before or the morning of your surgery and do not use powders/lotions on  skin.

## 2017-03-31 ENCOUNTER — ANESTHESIA (OUTPATIENT)
Dept: PERIOP | Facility: HOSPITAL | Age: 47
End: 2017-03-31

## 2017-03-31 ENCOUNTER — HOSPITAL ENCOUNTER (OUTPATIENT)
Facility: HOSPITAL | Age: 47
Setting detail: HOSPITAL OUTPATIENT SURGERY
Discharge: HOME | End: 2017-03-31
Attending: SURGERY | Admitting: SURGERY

## 2017-03-31 VITALS
SYSTOLIC BLOOD PRESSURE: 131 MMHG | TEMPERATURE: 99.2 F | HEART RATE: 89 BPM | OXYGEN SATURATION: 94 % | BODY MASS INDEX: 43.6 KG/M2 | DIASTOLIC BLOOD PRESSURE: 76 MMHG | RESPIRATION RATE: 20 BRPM | WEIGHT: 277.78 LBS | HEIGHT: 67 IN

## 2017-03-31 LAB — GLUCOSE BLDC GLUCOMTR-MCNC: 185 MG/DL (ref 70–130)

## 2017-03-31 PROCEDURE — 25010000002 HYDROMORPHONE PER 4 MG: Performed by: NURSE ANESTHETIST, CERTIFIED REGISTERED

## 2017-03-31 PROCEDURE — 25010000002 NEOSTIGMINE PER 0.5 MG: Performed by: NURSE ANESTHETIST, CERTIFIED REGISTERED

## 2017-03-31 PROCEDURE — 25010000002 ONDANSETRON PER 1 MG: Performed by: NURSE ANESTHETIST, CERTIFIED REGISTERED

## 2017-03-31 PROCEDURE — 82962 GLUCOSE BLOOD TEST: CPT

## 2017-03-31 PROCEDURE — 25010000002 FENTANYL CITRATE (PF) 100 MCG/2ML SOLUTION: Performed by: NURSE ANESTHETIST, CERTIFIED REGISTERED

## 2017-03-31 PROCEDURE — 25010000002 PROPOFOL 10 MG/ML EMULSION: Performed by: NURSE ANESTHETIST, CERTIFIED REGISTERED

## 2017-03-31 PROCEDURE — C1781 MESH (IMPLANTABLE): HCPCS | Performed by: SURGERY

## 2017-03-31 PROCEDURE — 25010000002 MIDAZOLAM PER 1 MG: Performed by: NURSE ANESTHETIST, CERTIFIED REGISTERED

## 2017-03-31 PROCEDURE — 49585 PR REPAIR UMBILICAL HERN,5+Y/O,REDUC: CPT | Performed by: SURGERY

## 2017-03-31 PROCEDURE — 25010000002 SUCCINYLCHOLINE PER 20 MG: Performed by: NURSE ANESTHETIST, CERTIFIED REGISTERED

## 2017-03-31 PROCEDURE — 25010000003 CEFAZOLIN PER 500 MG: Performed by: NURSE ANESTHETIST, CERTIFIED REGISTERED

## 2017-03-31 DEVICE — VENTRALEX ST HERNIA PATCH
Type: IMPLANTABLE DEVICE | Site: UMBILICAL | Status: FUNCTIONAL
Brand: VENTRALEX ST HERNIA PATCH

## 2017-03-31 RX ORDER — ONDANSETRON 2 MG/ML
INJECTION INTRAMUSCULAR; INTRAVENOUS AS NEEDED
Status: DISCONTINUED | OUTPATIENT
Start: 2017-03-31 | End: 2017-03-31 | Stop reason: SURG

## 2017-03-31 RX ORDER — CEFAZOLIN SODIUM 1 G/3ML
INJECTION, POWDER, FOR SOLUTION INTRAMUSCULAR; INTRAVENOUS AS NEEDED
Status: DISCONTINUED | OUTPATIENT
Start: 2017-03-31 | End: 2017-03-31 | Stop reason: SURG

## 2017-03-31 RX ORDER — GLYCOPYRROLATE 0.2 MG/ML
INJECTION INTRAMUSCULAR; INTRAVENOUS AS NEEDED
Status: DISCONTINUED | OUTPATIENT
Start: 2017-03-31 | End: 2017-03-31 | Stop reason: SURG

## 2017-03-31 RX ORDER — NALOXONE HCL 0.4 MG/ML
0.2 VIAL (ML) INJECTION AS NEEDED
Status: DISCONTINUED | OUTPATIENT
Start: 2017-03-31 | End: 2017-03-31 | Stop reason: HOSPADM

## 2017-03-31 RX ORDER — PROPOFOL 10 MG/ML
VIAL (ML) INTRAVENOUS AS NEEDED
Status: DISCONTINUED | OUTPATIENT
Start: 2017-03-31 | End: 2017-03-31 | Stop reason: SURG

## 2017-03-31 RX ORDER — ONDANSETRON 2 MG/ML
4 INJECTION INTRAMUSCULAR; INTRAVENOUS ONCE AS NEEDED
Status: DISCONTINUED | OUTPATIENT
Start: 2017-03-31 | End: 2017-03-31 | Stop reason: HOSPADM

## 2017-03-31 RX ORDER — MIDAZOLAM HYDROCHLORIDE 1 MG/ML
INJECTION INTRAMUSCULAR; INTRAVENOUS AS NEEDED
Status: DISCONTINUED | OUTPATIENT
Start: 2017-03-31 | End: 2017-03-31 | Stop reason: SURG

## 2017-03-31 RX ORDER — SODIUM CHLORIDE, SODIUM GLUCONATE, SODIUM ACETATE, POTASSIUM CHLORIDE, AND MAGNESIUM CHLORIDE 526; 502; 368; 37; 30 MG/100ML; MG/100ML; MG/100ML; MG/100ML; MG/100ML
INJECTION, SOLUTION INTRAVENOUS CONTINUOUS PRN
Status: DISCONTINUED | OUTPATIENT
Start: 2017-03-31 | End: 2017-03-31 | Stop reason: SURG

## 2017-03-31 RX ORDER — FLUMAZENIL 0.1 MG/ML
0.2 INJECTION INTRAVENOUS AS NEEDED
Status: DISCONTINUED | OUTPATIENT
Start: 2017-03-31 | End: 2017-03-31 | Stop reason: HOSPADM

## 2017-03-31 RX ORDER — DIPHENHYDRAMINE HYDROCHLORIDE 50 MG/ML
12.5 INJECTION INTRAMUSCULAR; INTRAVENOUS
Status: DISCONTINUED | OUTPATIENT
Start: 2017-03-31 | End: 2017-03-31 | Stop reason: HOSPADM

## 2017-03-31 RX ORDER — LABETALOL HYDROCHLORIDE 5 MG/ML
5 INJECTION, SOLUTION INTRAVENOUS
Status: DISCONTINUED | OUTPATIENT
Start: 2017-03-31 | End: 2017-03-31 | Stop reason: HOSPADM

## 2017-03-31 RX ORDER — SODIUM CHLORIDE 9 MG/ML
1000 INJECTION, SOLUTION INTRAVENOUS CONTINUOUS PRN
Status: DISCONTINUED | OUTPATIENT
Start: 2017-03-31 | End: 2017-03-31 | Stop reason: HOSPADM

## 2017-03-31 RX ORDER — SUCCINYLCHOLINE CHLORIDE 20 MG/ML
INJECTION INTRAMUSCULAR; INTRAVENOUS AS NEEDED
Status: DISCONTINUED | OUTPATIENT
Start: 2017-03-31 | End: 2017-03-31 | Stop reason: SURG

## 2017-03-31 RX ORDER — FENTANYL CITRATE 50 UG/ML
INJECTION, SOLUTION INTRAMUSCULAR; INTRAVENOUS AS NEEDED
Status: DISCONTINUED | OUTPATIENT
Start: 2017-03-31 | End: 2017-03-31 | Stop reason: SURG

## 2017-03-31 RX ORDER — HYDROCODONE BITARTRATE AND ACETAMINOPHEN 7.5; 325 MG/1; MG/1
1 TABLET ORAL EVERY 6 HOURS PRN
Qty: 30 TABLET | Refills: 0 | Status: SHIPPED | OUTPATIENT
Start: 2017-03-31 | End: 2017-05-01

## 2017-03-31 RX ORDER — HYDROCODONE BITARTRATE AND ACETAMINOPHEN 7.5; 325 MG/1; MG/1
1 TABLET ORAL ONCE AS NEEDED
Status: DISCONTINUED | OUTPATIENT
Start: 2017-03-31 | End: 2017-03-31 | Stop reason: HOSPADM

## 2017-03-31 RX ORDER — ROCURONIUM BROMIDE 10 MG/ML
INJECTION, SOLUTION INTRAVENOUS AS NEEDED
Status: DISCONTINUED | OUTPATIENT
Start: 2017-03-31 | End: 2017-03-31 | Stop reason: SURG

## 2017-03-31 RX ADMIN — MIDAZOLAM 2 MG: 1 INJECTION INTRAMUSCULAR; INTRAVENOUS at 07:40

## 2017-03-31 RX ADMIN — GLYCOPYRROLATE 0.4 MG: 0.2 INJECTION, SOLUTION INTRAMUSCULAR; INTRAVENOUS at 08:30

## 2017-03-31 RX ADMIN — FENTANYL CITRATE 50 MCG: 50 INJECTION, SOLUTION INTRAMUSCULAR; INTRAVENOUS at 07:46

## 2017-03-31 RX ADMIN — HYDROMORPHONE HYDROCHLORIDE 0.5 MG: 1 INJECTION, SOLUTION INTRAMUSCULAR; INTRAVENOUS; SUBCUTANEOUS at 09:05

## 2017-03-31 RX ADMIN — ROCURONIUM BROMIDE 20 MG: 10 INJECTION INTRAVENOUS at 08:05

## 2017-03-31 RX ADMIN — HYDROMORPHONE HYDROCHLORIDE 0.5 MG: 1 INJECTION, SOLUTION INTRAMUSCULAR; INTRAVENOUS; SUBCUTANEOUS at 09:16

## 2017-03-31 RX ADMIN — FENTANYL CITRATE 50 MCG: 50 INJECTION, SOLUTION INTRAMUSCULAR; INTRAVENOUS at 08:15

## 2017-03-31 RX ADMIN — PROPOFOL 50 MG: 10 INJECTION, EMULSION INTRAVENOUS at 08:05

## 2017-03-31 RX ADMIN — NEOSTIGMINE METHYLSULFATE 2 MG: 1 INJECTION, SOLUTION INTRAMUSCULAR; INTRAVENOUS; SUBCUTANEOUS at 08:30

## 2017-03-31 RX ADMIN — ONDANSETRON 4 MG: 2 INJECTION INTRAMUSCULAR; INTRAVENOUS at 08:15

## 2017-03-31 RX ADMIN — SODIUM CHLORIDE, SODIUM GLUCONATE, SODIUM ACETATE, POTASSIUM CHLORIDE, AND MAGNESIUM CHLORIDE: 526; 502; 368; 37; 30 INJECTION, SOLUTION INTRAVENOUS at 07:40

## 2017-03-31 RX ADMIN — CEFAZOLIN SODIUM 2 G: 1 INJECTION, POWDER, FOR SOLUTION INTRAMUSCULAR; INTRAVENOUS at 07:58

## 2017-03-31 RX ADMIN — HYDROMORPHONE HYDROCHLORIDE 0.5 MG: 1 INJECTION, SOLUTION INTRAMUSCULAR; INTRAVENOUS; SUBCUTANEOUS at 08:55

## 2017-03-31 RX ADMIN — SUCCINYLCHOLINE CHLORIDE 180 MG: 20 INJECTION, SOLUTION INTRAMUSCULAR; INTRAVENOUS at 07:57

## 2017-03-31 RX ADMIN — SODIUM CHLORIDE 1000 ML: 9 INJECTION, SOLUTION INTRAVENOUS at 06:23

## 2017-03-31 RX ADMIN — ROCURONIUM BROMIDE 5 MG: 10 INJECTION INTRAVENOUS at 07:57

## 2017-03-31 RX ADMIN — PROPOFOL 200 MG: 10 INJECTION, EMULSION INTRAVENOUS at 07:46

## 2017-03-31 NOTE — ANESTHESIA PREPROCEDURE EVALUATION
Anesthesia Evaluation     Patient summary reviewed and Nursing notes reviewed   NPO Status: > 8 hours   Airway   Mallampati: II  TM distance: >3 FB  Neck ROM: full  possible difficult intubation  Dental    (+) poor dentation    Comment: Carious,overall very poor repair;unrestorable dentition upper and lower.    Pulmonary - normal exam    breath sounds clear to auscultation  (+) a smoker Former, shortness of breath,   Cardiovascular - normal exam    ECG reviewed  Rhythm: regular  Rate: normal    (+) hypertension well controlled,     ROS comment: EKG:NSR    Neuro/Psych  (+) psychiatric history Depression,    GI/Hepatic/Renal/Endo    (+)  diabetes mellitus type 2 poorly controlled, hyperthyroidism (Graves Disease)    Musculoskeletal (-) negative ROS    Abdominal   (+) obese,    Substance History - negative use     OB/GYN negative ob/gyn ROS         Other - negative ROS                                   Anesthesia Plan    ASA 3     general     intravenous induction   Anesthetic plan and risks discussed with patient.

## 2017-03-31 NOTE — ANESTHESIA POSTPROCEDURE EVALUATION
Patient: Christen Casillas    Procedure Summary     Date Anesthesia Start Anesthesia Stop Room / Location    03/31/17 0741 [id number]  [facility] 09 / [facility]       Procedure Diagnosis Surgeon Provider    UMBILICAL HERNIA REPAIR with mesh  (N/A Abdomen) Umbilical hernia without obstruction and without gangrene  (Umbilical hernia without obstruction and without gangrene [K42.9]) MD Chepe Spencer MD          Anesthesia Type: general  Last vitals  BP      Temp      Pulse     Resp      SpO2        Post Anesthesia Care and Evaluation    Patient location during evaluation: PACU  Patient participation: complete - patient participated  Level of consciousness: awake and alert  Pain management: adequate  Airway patency: patent  Anesthetic complications: No anesthetic complications    Cardiovascular status: acceptable  Respiratory status: acceptable  Hydration status: acceptable

## 2017-03-31 NOTE — ANESTHESIA PROCEDURE NOTES
Airway  Urgency: elective    Airway not difficult    General Information and Staff    Patient location during procedure: OR  CRNA: JOHANNE LOZANO    Indications and Patient Condition  Indications for airway management: airway protection    Preoxygenated: yes  Mask difficulty assessment: 2 - vent by mask + OA or adjuvant +/- NMBA    Final Airway Details  Final airway type: endotracheal airway      Successful airway: ETT  Cuffed: yes   Successful intubation technique: direct laryngoscopy  Facilitating devices/methods: intubating stylet  Blade: Thea  Blade size: #4  ETT size: 7.5 mm  Cormack-Lehane Classification: grade I - full view of glottis  Placement verified by: chest auscultation and capnometry   Measured from: lips  ETT to lips (cm): 20  Number of attempts at approach: 1

## 2017-04-07 ENCOUNTER — OFFICE VISIT (OUTPATIENT)
Dept: SURGERY | Facility: CLINIC | Age: 47
End: 2017-04-07

## 2017-04-07 VITALS
WEIGHT: 273 LBS | HEIGHT: 67 IN | DIASTOLIC BLOOD PRESSURE: 84 MMHG | BODY MASS INDEX: 42.85 KG/M2 | SYSTOLIC BLOOD PRESSURE: 152 MMHG

## 2017-04-07 DIAGNOSIS — K42.9 UMBILICAL HERNIA WITHOUT OBSTRUCTION AND WITHOUT GANGRENE: Primary | ICD-10-CM

## 2017-04-07 PROCEDURE — 99024 POSTOP FOLLOW-UP VISIT: CPT | Performed by: SURGERY

## 2017-04-07 NOTE — PROGRESS NOTES
47-year-old female now 1 week out from her umbilical hernia repair with mesh.  Patient is doing well.  Her incision is clean and healing nicely.  There is no redness no drainage no fluid collection appreciated.  Abdomen is soft and unremarkable.  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  Would recommend she stay out for at least 1 more week after that she can return to work.  Patient will follow up with us on a when necessary basis

## 2017-04-24 ENCOUNTER — TRANSCRIBE ORDERS (OUTPATIENT)
Dept: LAB | Facility: HOSPITAL | Age: 47
End: 2017-04-24

## 2017-04-26 ENCOUNTER — APPOINTMENT (OUTPATIENT)
Dept: LAB | Facility: HOSPITAL | Age: 47
End: 2017-04-26

## 2017-04-26 ENCOUNTER — OFFICE VISIT (OUTPATIENT)
Dept: ENDOCRINOLOGY | Facility: CLINIC | Age: 47
End: 2017-04-26

## 2017-04-26 VITALS
WEIGHT: 278 LBS | HEART RATE: 86 BPM | SYSTOLIC BLOOD PRESSURE: 126 MMHG | BODY MASS INDEX: 43.63 KG/M2 | DIASTOLIC BLOOD PRESSURE: 80 MMHG | HEIGHT: 67 IN

## 2017-04-26 DIAGNOSIS — E11.42 DIABETIC POLYNEUROPATHY ASSOCIATED WITH TYPE 2 DIABETES MELLITUS (HCC): ICD-10-CM

## 2017-04-26 DIAGNOSIS — E05.00 GRAVES DISEASE: Primary | ICD-10-CM

## 2017-04-26 DIAGNOSIS — I10 ESSENTIAL HYPERTENSION: ICD-10-CM

## 2017-04-26 DIAGNOSIS — E11.9 TYPE 2 DIABETES MELLITUS WITHOUT COMPLICATION, WITHOUT LONG-TERM CURRENT USE OF INSULIN (HCC): ICD-10-CM

## 2017-04-26 LAB
ALBUMIN SERPL-MCNC: 4.2 G/DL (ref 3.4–4.8)
ALBUMIN/GLOB SERPL: 1.1 G/DL (ref 1.1–1.8)
ALP SERPL-CCNC: 131 U/L (ref 38–126)
ALT SERPL W P-5'-P-CCNC: 29 U/L (ref 9–52)
ANION GAP SERPL CALCULATED.3IONS-SCNC: 13 MMOL/L (ref 5–15)
AST SERPL-CCNC: 34 U/L (ref 14–36)
BASOPHILS # BLD AUTO: 0.06 10*3/MM3 (ref 0–0.2)
BASOPHILS NFR BLD AUTO: 1 % (ref 0–2)
BILIRUB SERPL-MCNC: 0.7 MG/DL (ref 0.2–1.3)
BUN BLD-MCNC: 13 MG/DL (ref 7–21)
BUN/CREAT SERPL: 21 (ref 7–25)
CALCIUM SPEC-SCNC: 9.4 MG/DL (ref 8.4–10.2)
CHLORIDE SERPL-SCNC: 98 MMOL/L (ref 95–110)
CO2 SERPL-SCNC: 25 MMOL/L (ref 22–31)
CREAT BLD-MCNC: 0.62 MG/DL (ref 0.5–1)
DEPRECATED RDW RBC AUTO: 41.6 FL (ref 36.4–46.3)
EOSINOPHIL # BLD AUTO: 0.11 10*3/MM3 (ref 0–0.7)
EOSINOPHIL NFR BLD AUTO: 1.7 % (ref 0–7)
ERYTHROCYTE [DISTWIDTH] IN BLOOD BY AUTOMATED COUNT: 13.8 % (ref 11.5–14.5)
GFR SERPL CREATININE-BSD FRML MDRD: 103 ML/MIN/1.73 (ref 58–135)
GLOBULIN UR ELPH-MCNC: 3.8 GM/DL (ref 2.3–3.5)
GLUCOSE BLD-MCNC: 199 MG/DL (ref 60–100)
HCT VFR BLD AUTO: 36.7 % (ref 35–45)
HGB BLD-MCNC: 12.6 G/DL (ref 12–15.5)
IMM GRANULOCYTES # BLD: 0.02 10*3/MM3 (ref 0–0.02)
IMM GRANULOCYTES NFR BLD: 0.3 % (ref 0–0.5)
LYMPHOCYTES # BLD AUTO: 1.38 10*3/MM3 (ref 0.6–4.2)
LYMPHOCYTES NFR BLD AUTO: 21.9 % (ref 10–50)
MCH RBC QN AUTO: 28.6 PG (ref 26.5–34)
MCHC RBC AUTO-ENTMCNC: 34.3 G/DL (ref 31.4–36)
MCV RBC AUTO: 83.2 FL (ref 80–98)
MONOCYTES # BLD AUTO: 0.53 10*3/MM3 (ref 0–0.9)
MONOCYTES NFR BLD AUTO: 8.4 % (ref 0–12)
NEUTROPHILS # BLD AUTO: 4.21 10*3/MM3 (ref 2–8.6)
NEUTROPHILS NFR BLD AUTO: 66.7 % (ref 37–80)
PLATELET # BLD AUTO: 147 10*3/MM3 (ref 150–450)
PMV BLD AUTO: 10.5 FL (ref 8–12)
POTASSIUM BLD-SCNC: 4.6 MMOL/L (ref 3.5–5.1)
PROT SERPL-MCNC: 8 G/DL (ref 6.3–8.6)
RBC # BLD AUTO: 4.41 10*6/MM3 (ref 3.77–5.16)
SODIUM BLD-SCNC: 136 MMOL/L (ref 137–145)
T3 SERPL-MCNC: 307 NG/DL (ref 97–169)
T4 FREE SERPL-MCNC: 1.94 NG/DL (ref 0.78–2.19)
TSH SERPL DL<=0.05 MIU/L-ACNC: <0.02 MIU/ML (ref 0.46–4.68)
WBC NRBC COR # BLD: 6.31 10*3/MM3 (ref 3.2–9.8)

## 2017-04-26 PROCEDURE — 80050 GENERAL HEALTH PANEL: CPT | Performed by: NURSE PRACTITIONER

## 2017-04-26 PROCEDURE — 84480 ASSAY TRIIODOTHYRONINE (T3): CPT | Performed by: NURSE PRACTITIONER

## 2017-04-26 PROCEDURE — 36415 COLL VENOUS BLD VENIPUNCTURE: CPT | Performed by: NURSE PRACTITIONER

## 2017-04-26 PROCEDURE — 84439 ASSAY OF FREE THYROXINE: CPT | Performed by: NURSE PRACTITIONER

## 2017-04-26 PROCEDURE — 99214 OFFICE O/P EST MOD 30 MIN: CPT | Performed by: NURSE PRACTITIONER

## 2017-04-26 NOTE — PROGRESS NOTES
Subjective    Christen Casillas is a 47 y.o. female. she is here today for follow-up.    History of Present Illness       Thyroid Problem            Hyperthyroidism from Graves' Disease      CAPELLAN ablation on Feb. 23, 2017     Duration diagnosed in June 2016        Timing - improving     Location-she has an asymmetric right more than left sided goiter board thyroid uptake and scan shows diffuse 60% uptake. This was Reviewed by . . There is no thyroid ultrasound        Severity - at this point to mild     Complications - none        Current symptoms/problems  initially weight loss, fatigue, heat intolerance, increased bowel movements, tachycardia but now all improved     She also has goiter right, more than left. She states this has decreased in size after starting PTU     Alleviating Factors: Compliance with PTU at a dose of 100 mg 3 times daily and metoprolol at a dose of 25 mg twice daily.     Side Effects developed respiratory difficulties with Tapazole        ---     Social diagnoses of diabetes, new diagnosis June 2016 June 2016     HgbA1c 6.8%     Now on metformin BId      She does not know dosage              The following portions of the patient's history were reviewed and updated as appropriate:   Past Medical History:   Diagnosis Date   • Depression    • Graves disease 10/24/2016   • Hypertension 10/24/2016   • SOB (shortness of breath)    • Type 2 diabetes mellitus without complication, without long-term current use of insulin 10/24/2016     Past Surgical History:   Procedure Laterality Date   • EXPLORATORY LAPAROTOMY     • TYMPANOPLASTY Left    • UMBILICAL HERNIA REPAIR N/A 3/31/2017    Procedure: UMBILICAL HERNIA REPAIR with mesh ;  Surgeon: Luke Chau MD;  Location: [facility];  Service:      Family History   Problem Relation Age of Onset   • Hypothyroidism Mother    • Skin cancer Mother    • Stomach cancer Brother    • Stomach cancer Maternal Aunt    • Breast cancer Paternal  Grandmother      OB History      Para Term  AB TAB SAB Ectopic Multiple Living    0 0 0 0 0 0 0 0 0 0        Current Outpatient Prescriptions   Medication Sig Dispense Refill   • acetaminophen (TYLENOL) 500 MG tablet Take 500 mg by mouth Every 6 (Six) Hours As Needed for Mild Pain (1-3).     • albuterol (PROVENTIL) (2.5 MG/3ML) 0.083% nebulizer solution Take 2.5 mg by nebulization Every 4 (Four) Hours As Needed for Wheezing.     • amLODIPine (NORVASC) 10 MG tablet Take 10 mg by mouth Every Night.     • budesonide-formoterol (SYMBICORT) 160-4.5 MCG/ACT inhaler Inhale 2 puffs 2 (Two) Times a Day As Needed.     • diphenhydrAMINE (BENADRYL) 25 mg capsule Take 25 mg by mouth Every 6 (Six) Hours As Needed for Itching.     • FLUoxetine (PROzac) 10 MG tablet Take 10 mg by mouth Every Night.     • gabapentin (NEURONTIN) 300 MG capsule Take 300 mg by mouth 3 (Three) Times a Day.     • HYDROcodone-acetaminophen (NORCO) 7.5-325 MG per tablet Take 1 tablet by mouth Every 6 (Six) Hours As Needed for Moderate Pain (4-6) (Pain). 30 tablet 0   • lisinopril (PRINIVIL,ZESTRIL) 40 MG tablet Take 40 mg by mouth Every Night.     • metoprolol tartrate (LOPRESSOR) 25 MG tablet Take 25 mg by mouth 2 (Two) Times a Day.     • ondansetron (ZOFRAN) 4 MG tablet Take 4 mg by mouth Every 8 (Eight) Hours As Needed.       No current facility-administered medications for this visit.      Allergies   Allergen Reactions   • Honey Bee Treatment [Bee Venom] Anaphylaxis   • Tapazole [Methimazole] Shortness Of Breath   • Aspirin Hives     also shortness of breath       Social History     Social History   • Marital status:      Spouse name: Timbo   • Number of children: 0   • Years of education: 12     Occupational History   • food prep      Social History Main Topics   • Smoking status: Former Smoker     Packs/day: 0.50     Years: 31.00     Types: Cigarettes     Quit date: 2011   • Smokeless tobacco: Never Used   • Alcohol use No   •  "Drug use: No   • Sexual activity: Defer     Other Topics Concern   • None     Social History Narrative       Review of Systems  Review of Systems   Constitutional: Negative for activity change, appetite change, chills, diaphoresis and fatigue.   HENT: Negative for congestion, dental problem, drooling, ear discharge, ear pain, facial swelling, sneezing, sore throat, tinnitus, trouble swallowing and voice change.    Eyes: Negative for photophobia, pain, discharge, redness, itching and visual disturbance.   Respiratory: Negative for apnea, cough, choking, chest tightness and shortness of breath.    Cardiovascular: Negative for chest pain, palpitations and leg swelling.   Gastrointestinal: Negative for abdominal distention, abdominal pain, constipation, diarrhea, nausea and vomiting.   Endocrine: Negative for cold intolerance, heat intolerance, polydipsia, polyphagia and polyuria.   Genitourinary: Negative for difficulty urinating, dysuria, frequency, hematuria and urgency.   Musculoskeletal: Negative for arthralgias, back pain, gait problem, joint swelling, myalgias, neck pain and neck stiffness.   Skin: Negative for color change, pallor, rash and wound.   Allergic/Immunologic: Negative for environmental allergies, food allergies and immunocompromised state.   Neurological: Negative for dizziness, tremors, facial asymmetry, weakness, light-headedness, numbness and headaches.   Hematological: Negative for adenopathy. Does not bruise/bleed easily.   Psychiatric/Behavioral: Negative for agitation, behavioral problems, confusion, decreased concentration and sleep disturbance.        Objective    /80 (BP Location: Left arm, Patient Position: Sitting, Cuff Size: Adult)  Pulse 86  Ht 67\" (170.2 cm)  Wt 278 lb (126 kg)  LMP 02/01/2016 (Exact Date)  BMI 43.54 kg/m2  Physical Exam   Constitutional: She is oriented to person, place, and time. She appears well-developed and well-nourished. No distress.   HENT:   Head: " Normocephalic and atraumatic.   Right Ear: External ear normal.   Left Ear: External ear normal.   Nose: Nose normal.   Eyes: Conjunctivae and EOM are normal. Pupils are equal, round, and reactive to light.   Neck: Normal range of motion. Neck supple. No tracheal deviation present. No thyromegaly present.   Right thyroid nodule    Cardiovascular: Normal rate, regular rhythm and normal heart sounds.    No murmur heard.  Pulmonary/Chest: Effort normal and breath sounds normal. No respiratory distress. She has no wheezes.   Abdominal: Soft. Bowel sounds are normal. There is no tenderness. There is no rebound and no guarding.   Musculoskeletal: Normal range of motion. She exhibits no edema, tenderness or deformity.   Neurological: She is alert and oriented to person, place, and time. No cranial nerve deficit.   Skin: Skin is warm and dry. No rash noted.   Psychiatric: She has a normal mood and affect. Her behavior is normal. Judgment and thought content normal.       Lab Review  Glucose   Date Value   03/30/2017 176 mg/dL (H)   02/06/2017 199 mg/dL (H)   10/24/2016 133 mg/dl (H)   09/19/2016 135 mg/dl (H)   05/26/2016 143 mg/dl (H)     Sodium (mmol/L)   Date Value   03/30/2017 139   02/06/2017 141   10/24/2016 138   09/19/2016 140   05/26/2016 141     Potassium (mmol/L)   Date Value   03/30/2017 4.3   02/06/2017 4.5   10/24/2016 4.7   09/19/2016 3.8   05/26/2016 4.7     Chloride (mmol/L)   Date Value   03/30/2017 100   02/06/2017 98   10/24/2016 97   09/19/2016 100   05/26/2016 100     CO2 (mmol/L)   Date Value   03/30/2017 29.0   02/06/2017 30.0   10/24/2016 31   09/19/2016 29   05/26/2016 23     BUN   Date Value   03/30/2017 10 mg/dL   02/06/2017 8 mg/dL   10/24/2016 8 mg/dl   09/19/2016 11 mg/dl   05/26/2016 13 mg/dl     Creatinine   Date Value   03/30/2017 0.52 mg/dL   02/06/2017 0.55 mg/dL   10/24/2016 0.7 mg/dl   09/19/2016 0.6 mg/dl   05/26/2016 0.4 mg/dl (L)     Hemoglobin A1C   Date Value   02/06/2017 6.96 %  (H)   06/07/2016 6.8 %TotHgb (H)     Triglycerides (mg/dl)   Date Value   06/07/2016 128       Assessment/Plan      1. Graves disease    2. Type 2 diabetes mellitus without complication, without long-term current use of insulin    3. Essential hypertension    4. Diabetic polyneuropathy associated with type 2 diabetes mellitus    .    Medications prescribed:  Outpatient Encounter Prescriptions as of 4/26/2017   Medication Sig Dispense Refill   • acetaminophen (TYLENOL) 500 MG tablet Take 500 mg by mouth Every 6 (Six) Hours As Needed for Mild Pain (1-3).     • albuterol (PROVENTIL) (2.5 MG/3ML) 0.083% nebulizer solution Take 2.5 mg by nebulization Every 4 (Four) Hours As Needed for Wheezing.     • amLODIPine (NORVASC) 10 MG tablet Take 10 mg by mouth Every Night.     • budesonide-formoterol (SYMBICORT) 160-4.5 MCG/ACT inhaler Inhale 2 puffs 2 (Two) Times a Day As Needed.     • diphenhydrAMINE (BENADRYL) 25 mg capsule Take 25 mg by mouth Every 6 (Six) Hours As Needed for Itching.     • FLUoxetine (PROzac) 10 MG tablet Take 10 mg by mouth Every Night.     • gabapentin (NEURONTIN) 300 MG capsule Take 300 mg by mouth 3 (Three) Times a Day.     • HYDROcodone-acetaminophen (NORCO) 7.5-325 MG per tablet Take 1 tablet by mouth Every 6 (Six) Hours As Needed for Moderate Pain (4-6) (Pain). 30 tablet 0   • lisinopril (PRINIVIL,ZESTRIL) 40 MG tablet Take 40 mg by mouth Every Night.     • metoprolol tartrate (LOPRESSOR) 25 MG tablet Take 25 mg by mouth 2 (Two) Times a Day.     • ondansetron (ZOFRAN) 4 MG tablet Take 4 mg by mouth Every 8 (Eight) Hours As Needed.       No facility-administered encounter medications on file as of 4/26/2017.        Orders placed during this encounter include:  Orders Placed This Encounter   Procedures   • Comprehensive Metabolic Panel   • TSH   • T4, Free   • T3         Blood Pressure Management:   Nl on lisinopril  Bronchospasm w metoprolol         Microvascular Complication Monitoring:       Neuropathy - on Neurontin 300 mg      --     Immunizations:            Preventive Care:      Non smoker     Weight Related:   Advised to consume 500 calories less per day  Exercise 30 min daily      Bone Health               Lab Results   Component Value Date     CALCIUM 9.6 09/19/2016         Thyroid Health            Lab Results   Component Value Date     TSH <0.01 (L) 08/16/2016      Graves Disease        Component  Latest Ref Rng 6/7/2016 8/16/2016   TSH Baseline  0.46 - 4.68 uIU/ml <0.01 (L) <0.01 (L)   Free T4  0.78 - 2.19 ng/dl   5.69 (H)   T3, Total  97 - 169 ng/dl   781 (H)   Thyrotropin Receptor Antibody  0.00 - 1.75 IU/L   11.71 (H)      dx is Graves' disease     PTU 2 tabs three times daily      Labs from Oct. 2016     TSH - <0.01  Free T4- 1.01  T3- 209     Decreased PTU to 2 tablets BID --now one TID      Continue metopolol 25 mg twice daily      Need new labs today and will call      Schedule for Radioactive Iodine. After Radioactive Iodine is given there is a transient worsening phase so 5 days after CAPELLAN restart PTU for about 2 weeks then stop      We will check levels 4 weeks after CAPELLAN , will leave a lab order     Don't stop metoprolol unless heart rate drops less than 60   On the contrary if heart rate speeds up to more than 100 while resting - double it .     will make appt w Dr. Vela and with us in about 8 weeks.     CAPELLAN ablation on Feb. 23, 2017      She started back on the PTU on March 1, 2017 will stop tomorrow    Has been off PTU for one month     Check labs today      Other Diabetes Related Aspects         No results found for: [id number]            Glycemic Management     Now on metformin ---- start B12 in the formal multivitamins daily     Has neuropathy and this could be related to either thyroid, diabetes or b12 Deficiency.     She is taking Neurontin    Hypertension    Norvasc 10 mg one daily                4. Follow-up: Return in about 6 weeks (around 6/7/2017) for  Recheck.

## 2017-04-27 DIAGNOSIS — E05.00 GRAVES' DISEASE: Primary | ICD-10-CM

## 2017-04-28 ENCOUNTER — TELEPHONE (OUTPATIENT)
Dept: ENDOCRINOLOGY | Facility: CLINIC | Age: 47
End: 2017-04-28

## 2017-04-28 NOTE — TELEPHONE ENCOUNTER
----- Message from NYLA Gibbs sent at 4/27/2017 11:56 AM CDT -----  Let her knowTSH still supressed Free T4 is normal but T3 is higher - still stay off PTU but check lab in one week we need to see what is happening; order is in epic

## 2017-05-01 ENCOUNTER — OFFICE VISIT (OUTPATIENT)
Dept: SURGERY | Facility: CLINIC | Age: 47
End: 2017-05-01

## 2017-05-01 VITALS
SYSTOLIC BLOOD PRESSURE: 142 MMHG | BODY MASS INDEX: 43.47 KG/M2 | DIASTOLIC BLOOD PRESSURE: 64 MMHG | HEIGHT: 67 IN | WEIGHT: 277 LBS

## 2017-05-01 DIAGNOSIS — K42.9 UMBILICAL HERNIA WITHOUT OBSTRUCTION AND WITHOUT GANGRENE: Primary | ICD-10-CM

## 2017-05-01 PROCEDURE — 99024 POSTOP FOLLOW-UP VISIT: CPT | Performed by: SURGERY

## 2017-05-09 RX ORDER — FLUOXETINE 10 MG/1
TABLET, FILM COATED ORAL
Qty: 30 TABLET | Refills: 3 | OUTPATIENT
Start: 2017-05-09

## 2017-05-17 ENCOUNTER — OFFICE VISIT (OUTPATIENT)
Dept: SURGERY | Facility: CLINIC | Age: 47
End: 2017-05-17

## 2017-05-17 VITALS
SYSTOLIC BLOOD PRESSURE: 150 MMHG | DIASTOLIC BLOOD PRESSURE: 60 MMHG | BODY MASS INDEX: 43.63 KG/M2 | HEIGHT: 67 IN | WEIGHT: 278 LBS

## 2017-05-17 DIAGNOSIS — K42.9 UMBILICAL HERNIA WITHOUT OBSTRUCTION AND WITHOUT GANGRENE: Primary | ICD-10-CM

## 2017-05-17 PROCEDURE — 99024 POSTOP FOLLOW-UP VISIT: CPT | Performed by: SURGERY

## 2017-05-31 ENCOUNTER — OFFICE VISIT (OUTPATIENT)
Dept: SURGERY | Facility: CLINIC | Age: 47
End: 2017-05-31

## 2017-05-31 VITALS
HEIGHT: 67 IN | WEIGHT: 280 LBS | DIASTOLIC BLOOD PRESSURE: 62 MMHG | SYSTOLIC BLOOD PRESSURE: 158 MMHG | BODY MASS INDEX: 43.95 KG/M2

## 2017-05-31 DIAGNOSIS — K42.9 UMBILICAL HERNIA WITHOUT OBSTRUCTION AND WITHOUT GANGRENE: Primary | ICD-10-CM

## 2017-05-31 PROCEDURE — 99024 POSTOP FOLLOW-UP VISIT: CPT | Performed by: SURGERY

## 2017-06-07 ENCOUNTER — OFFICE VISIT (OUTPATIENT)
Dept: ENDOCRINOLOGY | Facility: CLINIC | Age: 47
End: 2017-06-07

## 2017-06-07 ENCOUNTER — APPOINTMENT (OUTPATIENT)
Dept: LAB | Facility: HOSPITAL | Age: 47
End: 2017-06-07

## 2017-06-07 VITALS
BODY MASS INDEX: 42.97 KG/M2 | HEART RATE: 85 BPM | WEIGHT: 273.8 LBS | SYSTOLIC BLOOD PRESSURE: 124 MMHG | DIASTOLIC BLOOD PRESSURE: 82 MMHG | HEIGHT: 67 IN

## 2017-06-07 DIAGNOSIS — I10 ESSENTIAL HYPERTENSION: ICD-10-CM

## 2017-06-07 DIAGNOSIS — E11.42 DIABETIC POLYNEUROPATHY ASSOCIATED WITH TYPE 2 DIABETES MELLITUS (HCC): ICD-10-CM

## 2017-06-07 DIAGNOSIS — E11.9 TYPE 2 DIABETES MELLITUS WITHOUT COMPLICATION, WITHOUT LONG-TERM CURRENT USE OF INSULIN (HCC): Primary | ICD-10-CM

## 2017-06-07 DIAGNOSIS — E05.00 GRAVES DISEASE: ICD-10-CM

## 2017-06-07 LAB
ALBUMIN SERPL-MCNC: 4.3 G/DL (ref 3.4–4.8)
ALBUMIN/GLOB SERPL: 1 G/DL (ref 1.1–1.8)
ALP SERPL-CCNC: 149 U/L (ref 38–126)
ALT SERPL W P-5'-P-CCNC: 34 U/L (ref 9–52)
ANION GAP SERPL CALCULATED.3IONS-SCNC: 15 MMOL/L (ref 5–15)
AST SERPL-CCNC: 25 U/L (ref 14–36)
BILIRUB SERPL-MCNC: 0.8 MG/DL (ref 0.2–1.3)
BUN BLD-MCNC: 7 MG/DL (ref 7–21)
BUN/CREAT SERPL: 13.2 (ref 7–25)
CALCIUM SPEC-SCNC: 9.5 MG/DL (ref 8.4–10.2)
CHLORIDE SERPL-SCNC: 98 MMOL/L (ref 95–110)
CO2 SERPL-SCNC: 27 MMOL/L (ref 22–31)
CREAT BLD-MCNC: 0.53 MG/DL (ref 0.5–1)
GFR SERPL CREATININE-BSD FRML MDRD: 123 ML/MIN/1.73 (ref 58–135)
GLOBULIN UR ELPH-MCNC: 4.2 GM/DL (ref 2.3–3.5)
GLUCOSE BLD-MCNC: 167 MG/DL (ref 60–100)
HBA1C MFR BLD: 7.36 % (ref 4–5.6)
POTASSIUM BLD-SCNC: 4.1 MMOL/L (ref 3.5–5.1)
PROT SERPL-MCNC: 8.5 G/DL (ref 6.3–8.6)
SODIUM BLD-SCNC: 140 MMOL/L (ref 137–145)
T3 SERPL-MCNC: 265 NG/DL (ref 97–169)
T4 FREE SERPL-MCNC: 1.69 NG/DL (ref 0.78–2.19)
TSH SERPL DL<=0.05 MIU/L-ACNC: <0.02 MIU/ML (ref 0.46–4.68)

## 2017-06-07 PROCEDURE — 84443 ASSAY THYROID STIM HORMONE: CPT | Performed by: NURSE PRACTITIONER

## 2017-06-07 PROCEDURE — 84480 ASSAY TRIIODOTHYRONINE (T3): CPT | Performed by: NURSE PRACTITIONER

## 2017-06-07 PROCEDURE — 84439 ASSAY OF FREE THYROXINE: CPT | Performed by: NURSE PRACTITIONER

## 2017-06-07 PROCEDURE — 36415 COLL VENOUS BLD VENIPUNCTURE: CPT | Performed by: NURSE PRACTITIONER

## 2017-06-07 PROCEDURE — 99214 OFFICE O/P EST MOD 30 MIN: CPT | Performed by: NURSE PRACTITIONER

## 2017-06-07 PROCEDURE — 83036 HEMOGLOBIN GLYCOSYLATED A1C: CPT | Performed by: NURSE PRACTITIONER

## 2017-06-07 PROCEDURE — 80053 COMPREHEN METABOLIC PANEL: CPT | Performed by: NURSE PRACTITIONER

## 2017-06-07 NOTE — PROGRESS NOTES
Subjective    Christen Casillas is a 47 y.o. female. she is here today for follow-up.    History of Present Illness       Thyroid Problem            Hyperthyroidism from Graves' Disease      CAPELLAN ablation on Feb. 23, 2017     Duration diagnosed in June 2016        Timing - improving     Location-she has an asymmetric right more than left sided goiter board thyroid uptake and scan shows diffuse 60% uptake. This was Reviewed by . . There is no thyroid ultrasound        Severity - at this point to mild     Complications - none        Current symptoms/problems  initially weight loss, fatigue, heat intolerance, increased bowel movements, tachycardia but now all improved     She also has goiter right, more than left. She states this has decreased in size after starting PTU     Alleviating Factors: Compliance with PTU at a dose of 100 mg 3 times daily and metoprolol at a dose of 25 mg twice daily.     Side Effects developed respiratory difficulties with Tapazole        ---     Social diagnoses of diabetes, new diagnosis June 2016 June 2016     HgbA1c 6.8%     Now on metformin BId      She does not know dosage               The following portions of the patient's history were reviewed and updated as appropriate:   Past Medical History:   Diagnosis Date   • Depression    • Graves disease 10/24/2016   • Hypertension 10/24/2016   • SOB (shortness of breath)    • Type 2 diabetes mellitus without complication, without long-term current use of insulin 10/24/2016     Past Surgical History:   Procedure Laterality Date   • EXPLORATORY LAPAROTOMY     • TYMPANOPLASTY Left    • UMBILICAL HERNIA REPAIR N/A 3/31/2017    Procedure: UMBILICAL HERNIA REPAIR with mesh ;  Surgeon: Luke Chau MD;  Location: [facility];  Service:      Family History   Problem Relation Age of Onset   • Hypothyroidism Mother    • Skin cancer Mother    • Stomach cancer Brother    • Stomach cancer Maternal Aunt    • Breast cancer Paternal  Grandmother      OB History      Para Term  AB TAB SAB Ectopic Multiple Living    0 0 0 0 0 0 0 0 0 0        Current Outpatient Prescriptions   Medication Sig Dispense Refill   • acetaminophen (TYLENOL) 500 MG tablet Take 500 mg by mouth Every 6 (Six) Hours As Needed for Mild Pain (1-3).     • albuterol (PROVENTIL) (2.5 MG/3ML) 0.083% nebulizer solution Take 2.5 mg by nebulization Every 4 (Four) Hours As Needed for Wheezing.     • amLODIPine (NORVASC) 10 MG tablet Take 10 mg by mouth Every Night.     • budesonide-formoterol (SYMBICORT) 160-4.5 MCG/ACT inhaler Inhale 2 puffs 2 (Two) Times a Day As Needed.     • diphenhydrAMINE (BENADRYL) 25 mg capsule Take 25 mg by mouth Every 6 (Six) Hours As Needed for Itching.     • FLUoxetine (PROzac) 10 MG tablet Take 10 mg by mouth Every Night.     • gabapentin (NEURONTIN) 300 MG capsule Take 300 mg by mouth 3 (Three) Times a Day.     • lisinopril (PRINIVIL,ZESTRIL) 40 MG tablet Take 40 mg by mouth Every Night.     • metoprolol tartrate (LOPRESSOR) 25 MG tablet Take 25 mg by mouth 2 (Two) Times a Day.     • ondansetron (ZOFRAN) 4 MG tablet Take 4 mg by mouth Every 8 (Eight) Hours As Needed.       No current facility-administered medications for this visit.      Allergies   Allergen Reactions   • Honey Bee Treatment [Bee Venom] Anaphylaxis   • Tapazole [Methimazole] Shortness Of Breath   • Aspirin Hives     also shortness of breath       Social History     Social History   • Marital status:      Spouse name: Timbo   • Number of children: 0   • Years of education: 12     Occupational History   • food prep      Social History Main Topics   • Smoking status: Former Smoker     Packs/day: 0.50     Years: 31.00     Types: Cigarettes     Quit date: 2011   • Smokeless tobacco: Never Used   • Alcohol use No   • Drug use: No   • Sexual activity: Defer     Other Topics Concern   • None     Social History Narrative       Review of Systems  Review of Systems  "  Constitutional: Positive for fatigue. Negative for activity change, appetite change, chills and diaphoresis.   HENT: Negative for congestion, dental problem, drooling, ear discharge, ear pain, facial swelling, sneezing, sore throat, tinnitus, trouble swallowing and voice change.    Eyes: Negative for photophobia, pain, discharge, redness, itching and visual disturbance.   Respiratory: Negative for apnea, cough, choking, chest tightness and shortness of breath.    Cardiovascular: Negative for chest pain, palpitations and leg swelling.   Gastrointestinal: Negative for abdominal distention, abdominal pain, constipation, diarrhea, nausea and vomiting.   Endocrine: Negative for cold intolerance, heat intolerance, polydipsia, polyphagia and polyuria.   Genitourinary: Negative for difficulty urinating, dysuria, frequency, hematuria and urgency.   Musculoskeletal: Negative for arthralgias, back pain, gait problem, joint swelling, myalgias, neck pain and neck stiffness.   Skin: Negative for color change, pallor, rash and wound.   Allergic/Immunologic: Negative for environmental allergies, food allergies and immunocompromised state.   Neurological: Negative for dizziness, tremors, facial asymmetry, weakness, light-headedness, numbness and headaches.   Hematological: Negative for adenopathy. Does not bruise/bleed easily.   Psychiatric/Behavioral: Negative for agitation, behavioral problems, confusion, decreased concentration and sleep disturbance.        Objective    /82 (BP Location: Right arm, Patient Position: Sitting, Cuff Size: Adult)  Pulse 85  Ht 67\" (170.2 cm)  Wt 273 lb 12.8 oz (124 kg)  LMP 02/01/2016 (Exact Date)  BMI 42.88 kg/m2  Physical Exam   Constitutional: She is oriented to person, place, and time. She appears well-developed and well-nourished. No distress.   HENT:   Head: Normocephalic and atraumatic.   Right Ear: External ear normal.   Left Ear: External ear normal.   Nose: Nose normal.   Eyes: " Conjunctivae and EOM are normal. Pupils are equal, round, and reactive to light.   Neck: Normal range of motion. Neck supple. No tracheal deviation present. No thyromegaly present.   Cardiovascular: Normal rate, regular rhythm and normal heart sounds.    No murmur heard.  Pulmonary/Chest: Effort normal and breath sounds normal. No respiratory distress. She has no wheezes.   Abdominal: Soft. Bowel sounds are normal. There is no tenderness. There is no rebound and no guarding.   Musculoskeletal: Normal range of motion. She exhibits no edema, tenderness or deformity.   Neurological: She is alert and oriented to person, place, and time. No cranial nerve deficit.   Skin: Skin is warm and dry. No rash noted.   Psychiatric: She has a normal mood and affect. Her behavior is normal. Judgment and thought content normal.       Lab Review  Glucose (mg/dL)   Date Value   04/26/2017 199 (H)   03/30/2017 176 (H)   02/06/2017 199 (H)     Sodium (mmol/L)   Date Value   04/26/2017 136 (L)   03/30/2017 139   02/06/2017 141     Potassium (mmol/L)   Date Value   04/26/2017 4.6   03/30/2017 4.3   02/06/2017 4.5     Chloride (mmol/L)   Date Value   04/26/2017 98   03/30/2017 100   02/06/2017 98     CO2 (mmol/L)   Date Value   04/26/2017 25.0   03/30/2017 29.0   02/06/2017 30.0     BUN (mg/dL)   Date Value   04/26/2017 13   03/30/2017 10   02/06/2017 8     Creatinine (mg/dL)   Date Value   04/26/2017 0.62   03/30/2017 0.52   02/06/2017 0.55     Hemoglobin A1C   Date Value   02/06/2017 6.96 % (H)   06/07/2016 6.8 %TotHgb (H)     Triglycerides (mg/dl)   Date Value   06/07/2016 128       Assessment/Plan      1. Type 2 diabetes mellitus without complication, without long-term current use of insulin    2. Graves disease    3. Diabetic polyneuropathy associated with type 2 diabetes mellitus    4. Essential hypertension    .    Medications prescribed:  Outpatient Encounter Prescriptions as of 6/7/2017   Medication Sig Dispense Refill   •  acetaminophen (TYLENOL) 500 MG tablet Take 500 mg by mouth Every 6 (Six) Hours As Needed for Mild Pain (1-3).     • albuterol (PROVENTIL) (2.5 MG/3ML) 0.083% nebulizer solution Take 2.5 mg by nebulization Every 4 (Four) Hours As Needed for Wheezing.     • amLODIPine (NORVASC) 10 MG tablet Take 10 mg by mouth Every Night.     • budesonide-formoterol (SYMBICORT) 160-4.5 MCG/ACT inhaler Inhale 2 puffs 2 (Two) Times a Day As Needed.     • diphenhydrAMINE (BENADRYL) 25 mg capsule Take 25 mg by mouth Every 6 (Six) Hours As Needed for Itching.     • FLUoxetine (PROzac) 10 MG tablet Take 10 mg by mouth Every Night.     • gabapentin (NEURONTIN) 300 MG capsule Take 300 mg by mouth 3 (Three) Times a Day.     • lisinopril (PRINIVIL,ZESTRIL) 40 MG tablet Take 40 mg by mouth Every Night.     • metoprolol tartrate (LOPRESSOR) 25 MG tablet Take 25 mg by mouth 2 (Two) Times a Day.     • ondansetron (ZOFRAN) 4 MG tablet Take 4 mg by mouth Every 8 (Eight) Hours As Needed.       No facility-administered encounter medications on file as of 6/7/2017.        Orders placed during this encounter include:  Orders Placed This Encounter   Procedures   • Comprehensive Metabolic Panel   • TSH   • T4, Free   • T3   • Hemoglobin A1c   • CBC & Differential     Order Specific Question:   Manual Differential     Answer:   No       Blood Pressure Management:   Nl on lisinopril  Bronchospasm w metoprolol         Microvascular Complication Monitoring:      Neuropathy - on Neurontin 300 mg      --     Immunizations:            Preventive Care:      Non smoker     Weight Related:   Advised to consume 500 calories less per day  Exercise 30 min daily      Bone Health               Lab Results   Component Value Date     CALCIUM 9.6 09/19/2016         Thyroid Health            Lab Results   Component Value Date     TSH <0.01 (L) 08/16/2016      Graves Disease        Component  Latest Ref Rng 6/7/2016 8/16/2016   TSH Baseline  0.46 - 4.68 uIU/ml <0.01 (L)  <0.01 (L)   Free T4  0.78 - 2.19 ng/dl   5.69 (H)   T3, Total  97 - 169 ng/dl   781 (H)   Thyrotropin Receptor Antibody  0.00 - 1.75 IU/L   11.71 (H)      dx is Graves' disease     PTU 2 tabs three times daily      Labs from Oct. 2016     TSH - <0.01  Free T4- 1.01  T3- 209     Decreased PTU to 2 tablets BID --now one TID      Continue metopolol 25 mg twice daily      Need new labs today and will call      Schedule for Radioactive Iodine. After Radioactive Iodine is given there is a transient worsening phase so 5 days after CAPELLAN restart PTU for about 2 weeks then stop      We will check levels 4 weeks after CAPELLAN , will leave a lab order     Don't stop metoprolol unless heart rate drops less than 60   On the contrary if heart rate speeds up to more than 100 while resting - double it .     will make appt w Dr. Vela and with us in about 8 weeks.     CAPELLAN ablation on Feb. 23, 2017      She started back on the PTU on March 1, 2017 will stop tomorrow     Has been off PTU for one month     Lab Results   Component Value Date    TSH <0.020 (L) 04/26/2017    Q5ORACK 307.0 (H) 04/26/2017    THYROIDAB 149 (H) 10/24/2016        Asked patient to recheck labs in 2 weeks and she did not     We need new labs today          Other Diabetes Related Aspects         No results found for: [id number]            Glycemic Management     Now on metformin ---- start B12 in the formal multivitamins daily     Has neuropathy and this could be related to either thyroid, diabetes or b12 Deficiency.     She is taking Neurontin     Hypertension     Norvasc 10 mg one daily              4. Follow-up: Return in about 6 weeks (around 7/19/2017) for Recheck.

## 2017-06-12 ENCOUNTER — TELEPHONE (OUTPATIENT)
Dept: ENDOCRINOLOGY | Facility: CLINIC | Age: 47
End: 2017-06-12

## 2017-06-12 NOTE — TELEPHONE ENCOUNTER
----- Message from NYLA Gibbs sent at 6/8/2017  8:56 AM CDT -----  Call patient with result--A1c was 7.3 ( 7 is goal) T3 came down , T4 is normal , TSH - still suppressed discussed with Siddharth will wait not restart medication and recheck again at next visit;

## 2017-08-01 ENCOUNTER — OFFICE VISIT (OUTPATIENT)
Dept: ENDOCRINOLOGY | Facility: CLINIC | Age: 47
End: 2017-08-01

## 2017-08-01 ENCOUNTER — APPOINTMENT (OUTPATIENT)
Dept: LAB | Facility: HOSPITAL | Age: 47
End: 2017-08-01

## 2017-08-01 VITALS
HEART RATE: 79 BPM | SYSTOLIC BLOOD PRESSURE: 138 MMHG | DIASTOLIC BLOOD PRESSURE: 74 MMHG | WEIGHT: 289 LBS | HEIGHT: 67 IN | BODY MASS INDEX: 45.36 KG/M2

## 2017-08-01 DIAGNOSIS — E11.9 TYPE 2 DIABETES MELLITUS WITHOUT COMPLICATION, WITHOUT LONG-TERM CURRENT USE OF INSULIN (HCC): Primary | ICD-10-CM

## 2017-08-01 DIAGNOSIS — E05.00 GRAVES' DISEASE: ICD-10-CM

## 2017-08-01 DIAGNOSIS — I10 ESSENTIAL HYPERTENSION: ICD-10-CM

## 2017-08-01 DIAGNOSIS — E11.42 DIABETIC POLYNEUROPATHY ASSOCIATED WITH TYPE 2 DIABETES MELLITUS (HCC): ICD-10-CM

## 2017-08-01 LAB
ALBUMIN SERPL-MCNC: 4.2 G/DL (ref 3.4–4.8)
ALBUMIN/GLOB SERPL: 1.2 G/DL (ref 1.1–1.8)
ALP SERPL-CCNC: 139 U/L (ref 38–126)
ALT SERPL W P-5'-P-CCNC: 34 U/L (ref 9–52)
ANION GAP SERPL CALCULATED.3IONS-SCNC: 8 MMOL/L (ref 5–15)
AST SERPL-CCNC: 33 U/L (ref 14–36)
BASOPHILS # BLD AUTO: 0.04 10*3/MM3 (ref 0–0.2)
BASOPHILS NFR BLD AUTO: 0.4 % (ref 0–2)
BILIRUB SERPL-MCNC: 0.6 MG/DL (ref 0.2–1.3)
BUN BLD-MCNC: 15 MG/DL (ref 7–21)
BUN/CREAT SERPL: 21.4 (ref 7–25)
CALCIUM SPEC-SCNC: 9.5 MG/DL (ref 8.4–10.2)
CHLORIDE SERPL-SCNC: 98 MMOL/L (ref 95–110)
CO2 SERPL-SCNC: 30 MMOL/L (ref 22–31)
CREAT BLD-MCNC: 0.7 MG/DL (ref 0.5–1)
DEPRECATED RDW RBC AUTO: 42.5 FL (ref 36.4–46.3)
EOSINOPHIL # BLD AUTO: 0.14 10*3/MM3 (ref 0–0.7)
EOSINOPHIL NFR BLD AUTO: 1.5 % (ref 0–7)
ERYTHROCYTE [DISTWIDTH] IN BLOOD BY AUTOMATED COUNT: 13.9 % (ref 11.5–14.5)
GFR SERPL CREATININE-BSD FRML MDRD: 89 ML/MIN/1.73 (ref 58–135)
GLOBULIN UR ELPH-MCNC: 3.5 GM/DL (ref 2.3–3.5)
GLUCOSE BLD-MCNC: 187 MG/DL (ref 60–100)
HCT VFR BLD AUTO: 39.9 % (ref 35–45)
HGB BLD-MCNC: 12.8 G/DL (ref 12–15.5)
IMM GRANULOCYTES # BLD: 0.01 10*3/MM3 (ref 0–0.02)
IMM GRANULOCYTES NFR BLD: 0.1 % (ref 0–0.5)
LYMPHOCYTES # BLD AUTO: 1.96 10*3/MM3 (ref 0.6–4.2)
LYMPHOCYTES NFR BLD AUTO: 20.9 % (ref 10–50)
MCH RBC QN AUTO: 27.2 PG (ref 26.5–34)
MCHC RBC AUTO-ENTMCNC: 32.1 G/DL (ref 31.4–36)
MCV RBC AUTO: 84.7 FL (ref 80–98)
MONOCYTES # BLD AUTO: 0.62 10*3/MM3 (ref 0–0.9)
MONOCYTES NFR BLD AUTO: 6.6 % (ref 0–12)
NEUTROPHILS # BLD AUTO: 6.6 10*3/MM3 (ref 2–8.6)
NEUTROPHILS NFR BLD AUTO: 70.5 % (ref 37–80)
PLATELET # BLD AUTO: 160 10*3/MM3 (ref 150–450)
PMV BLD AUTO: 10.9 FL (ref 8–12)
POTASSIUM BLD-SCNC: 5.1 MMOL/L (ref 3.5–5.1)
PROT SERPL-MCNC: 7.7 G/DL (ref 6.3–8.6)
RBC # BLD AUTO: 4.71 10*6/MM3 (ref 3.77–5.16)
SODIUM BLD-SCNC: 136 MMOL/L (ref 137–145)
T3 SERPL-MCNC: 202 NG/DL (ref 97–169)
T4 FREE SERPL-MCNC: 1.42 NG/DL (ref 0.78–2.19)
TSH SERPL DL<=0.05 MIU/L-ACNC: <0.02 MIU/ML (ref 0.46–4.68)
WBC NRBC COR # BLD: 9.37 10*3/MM3 (ref 3.2–9.8)

## 2017-08-01 PROCEDURE — 80053 COMPREHEN METABOLIC PANEL: CPT | Performed by: NURSE PRACTITIONER

## 2017-08-01 PROCEDURE — 84480 ASSAY TRIIODOTHYRONINE (T3): CPT | Performed by: NURSE PRACTITIONER

## 2017-08-01 PROCEDURE — 99214 OFFICE O/P EST MOD 30 MIN: CPT | Performed by: NURSE PRACTITIONER

## 2017-08-01 PROCEDURE — 84439 ASSAY OF FREE THYROXINE: CPT | Performed by: NURSE PRACTITIONER

## 2017-08-01 PROCEDURE — 36415 COLL VENOUS BLD VENIPUNCTURE: CPT | Performed by: NURSE PRACTITIONER

## 2017-08-01 PROCEDURE — 80050 GENERAL HEALTH PANEL: CPT | Performed by: NURSE PRACTITIONER

## 2017-08-01 PROCEDURE — 85025 COMPLETE CBC W/AUTO DIFF WBC: CPT | Performed by: NURSE PRACTITIONER

## 2017-08-01 RX ORDER — FLUOXETINE 10 MG/1
CAPSULE ORAL
COMMUNITY
Start: 2017-05-17

## 2017-08-01 RX ORDER — LISINOPRIL 40 MG/1
TABLET ORAL
COMMUNITY
Start: 2017-05-31

## 2017-08-01 RX ORDER — AMLODIPINE BESYLATE 10 MG/1
10 TABLET ORAL
COMMUNITY
Start: 2017-07-20 | End: 2018-07-21

## 2017-08-01 RX ORDER — GABAPENTIN 300 MG/1
300 CAPSULE ORAL
COMMUNITY
Start: 2017-07-20

## 2017-08-01 RX ORDER — ONDANSETRON 4 MG/1
TABLET, FILM COATED ORAL
COMMUNITY
Start: 2016-10-11 | End: 2018-05-24

## 2017-08-01 NOTE — PROGRESS NOTES
Subjective    Christen Casillas is a 47 y.o. female. she is here today for follow-up.    History of Present Illness         Hyperthyroidism from Graves' Disease      CAPELLAN ablation on Feb. 23, 2017     Duration diagnosed in June 2016        Timing - improving     Location-she has an asymmetric right more than left sided goiter board thyroid uptake and scan shows diffuse 60% uptake. This was Reviewed by . . There is no thyroid ultrasound        Severity - at this point to mild     Complications - none        Current symptoms/problems  initially weight loss, fatigue, heat intolerance, increased bowel movements, tachycardia but now all improved     She also has goiter right, more than left. She states this has decreased in size after starting PTU     Alleviating Factors: Compliance with PTU at a dose of 100 mg 3 times daily and metoprolol at a dose of 25 mg twice daily.     Side Effects developed respiratory difficulties with Tapazole        ---     Social diagnoses of diabetes, new diagnosis June 2016 June 2016     HgbA1c 6.8%     Now on metformin BId      She does not know dosage              The following portions of the patient's history were reviewed and updated as appropriate:   Past Medical History:   Diagnosis Date   • Depression    • Graves disease 10/24/2016   • Hypertension 10/24/2016   • SOB (shortness of breath)    • Type 2 diabetes mellitus without complication, without long-term current use of insulin 10/24/2016     Past Surgical History:   Procedure Laterality Date   • EXPLORATORY LAPAROTOMY     • TYMPANOPLASTY Left    • UMBILICAL HERNIA REPAIR N/A 3/31/2017    Procedure: UMBILICAL HERNIA REPAIR with mesh ;  Surgeon: Luke Chau MD;  Location: [facility];  Service:      Family History   Problem Relation Age of Onset   • Hypothyroidism Mother    • Skin cancer Mother    • Stomach cancer Brother    • Stomach cancer Maternal Aunt    • Breast cancer Paternal Grandmother      OB  History      Para Term  AB TAB SAB Ectopic Multiple Living    0 0 0 0 0 0 0 0 0 0        Current Outpatient Prescriptions   Medication Sig Dispense Refill   • amLODIPine (NORVASC) 10 MG tablet Take 10 mg by mouth.     • FLUoxetine (PROZAC) 10 MG capsule TAKE 1 TABLET BY MOUTH EVERY DAY     • gabapentin (NEURONTIN) 300 MG capsule Take 300 mg by mouth.     • lisinopril (PRINIVIL,ZESTRIL) 40 MG tablet TAKE 1 TABLET BY MOUTH EVERY DAY     • ondansetron (ZOFRAN) 4 MG tablet      • acetaminophen (TYLENOL) 500 MG tablet Take 500 mg by mouth Every 6 (Six) Hours As Needed for Mild Pain (1-3).     • albuterol (PROVENTIL) (2.5 MG/3ML) 0.083% nebulizer solution Take 2.5 mg by nebulization Every 4 (Four) Hours As Needed for Wheezing.     • amLODIPine (NORVASC) 10 MG tablet Take 10 mg by mouth Every Night.     • budesonide-formoterol (SYMBICORT) 160-4.5 MCG/ACT inhaler Inhale 2 puffs 2 (Two) Times a Day As Needed.     • diphenhydrAMINE (BENADRYL) 25 mg capsule Take 25 mg by mouth Every 6 (Six) Hours As Needed for Itching.     • FLUoxetine (PROzac) 10 MG tablet Take 10 mg by mouth Every Night.     • gabapentin (NEURONTIN) 300 MG capsule Take 300 mg by mouth 3 (Three) Times a Day.     • lisinopril (PRINIVIL,ZESTRIL) 40 MG tablet Take 40 mg by mouth Every Night.     • metoprolol tartrate (LOPRESSOR) 25 MG tablet Take 25 mg by mouth 2 (Two) Times a Day.     • ondansetron (ZOFRAN) 4 MG tablet Take 4 mg by mouth Every 8 (Eight) Hours As Needed.       No current facility-administered medications for this visit.      Allergies   Allergen Reactions   • Honey Bee Treatment [Bee Venom] Anaphylaxis   • Tapazole [Methimazole] Shortness Of Breath   • Aspirin Hives     also shortness of breath       Social History     Social History   • Marital status:      Spouse name: Timbo   • Number of children: 0   • Years of education: 12     Occupational History   • food prep      Social History Main Topics   • Smoking status: Former  "Smoker     Packs/day: 0.50     Years: 31.00     Types: Cigarettes     Quit date: 09/2011   • Smokeless tobacco: Never Used   • Alcohol use No   • Drug use: No   • Sexual activity: Defer     Other Topics Concern   • None     Social History Narrative       Review of Systems  Review of Systems   Constitutional: Negative for activity change, appetite change, chills, diaphoresis and fatigue.   HENT: Negative for congestion, dental problem, drooling, ear discharge, ear pain, facial swelling, sneezing, sore throat, tinnitus, trouble swallowing and voice change.    Eyes: Negative for photophobia, pain, discharge, redness, itching and visual disturbance.   Respiratory: Negative for apnea, cough, choking, chest tightness and shortness of breath.    Cardiovascular: Negative for chest pain, palpitations and leg swelling.   Gastrointestinal: Negative for abdominal distention, abdominal pain, constipation, diarrhea, nausea and vomiting.   Endocrine: Negative for cold intolerance, heat intolerance, polydipsia, polyphagia and polyuria.   Genitourinary: Negative for difficulty urinating, dysuria, frequency, hematuria and urgency.   Musculoskeletal: Negative for arthralgias, back pain, gait problem, joint swelling, myalgias, neck pain and neck stiffness.   Skin: Negative for color change, pallor, rash and wound.   Allergic/Immunologic: Negative for environmental allergies, food allergies and immunocompromised state.   Neurological: Negative for dizziness, tremors, facial asymmetry, weakness, light-headedness, numbness and headaches.   Hematological: Negative for adenopathy. Does not bruise/bleed easily.   Psychiatric/Behavioral: Negative for agitation, behavioral problems, confusion and sleep disturbance.        Objective    /74 (BP Location: Left arm, Patient Position: Sitting, Cuff Size: Adult)  Pulse 79  Ht 67\" (170.2 cm)  Wt 289 lb (131 kg)  LMP 02/01/2016 (Exact Date)  BMI 45.26 kg/m2  Physical Exam   Constitutional: " She is oriented to person, place, and time. She appears well-developed and well-nourished. No distress.   HENT:   Head: Normocephalic and atraumatic.   Right Ear: External ear normal.   Left Ear: External ear normal.   Nose: Nose normal.   Eyes: Conjunctivae and EOM are normal. Pupils are equal, round, and reactive to light.   Neck: Normal range of motion. Neck supple. No tracheal deviation present. No thyromegaly present.   Cardiovascular: Normal rate, regular rhythm and normal heart sounds.    No murmur heard.  Pulmonary/Chest: Effort normal and breath sounds normal. No respiratory distress. She has no wheezes.   Abdominal: Soft. Bowel sounds are normal. There is no tenderness. There is no rebound and no guarding.   Musculoskeletal: Normal range of motion. She exhibits no edema, tenderness or deformity.   Neurological: She is alert and oriented to person, place, and time. No cranial nerve deficit.   Skin: Skin is warm and dry. No rash noted.   Psychiatric: She has a normal mood and affect. Her behavior is normal. Judgment and thought content normal.       Lab Review  Glucose (mg/dL)   Date Value   06/07/2017 167 (H)   04/26/2017 199 (H)   03/30/2017 176 (H)     Sodium (mmol/L)   Date Value   06/07/2017 140   04/26/2017 136 (L)   03/30/2017 139     Potassium (mmol/L)   Date Value   06/07/2017 4.1   04/26/2017 4.6   03/30/2017 4.3     Chloride (mmol/L)   Date Value   06/07/2017 98   04/26/2017 98   03/30/2017 100     CO2 (mmol/L)   Date Value   06/07/2017 27.0   04/26/2017 25.0   03/30/2017 29.0     BUN (mg/dL)   Date Value   06/07/2017 7   04/26/2017 13   03/30/2017 10     Creatinine (mg/dL)   Date Value   06/07/2017 0.53   04/26/2017 0.62   03/30/2017 0.52     Hemoglobin A1C   Date Value   06/07/2017 7.36 % (H)   02/06/2017 6.96 % (H)   06/07/2016 6.8 %TotHgb (H)     Triglycerides (mg/dl)   Date Value   06/07/2016 128       Assessment/Plan      1. Type 2 diabetes mellitus without complication, without long-term  current use of insulin    2. Graves' disease    3. Diabetic polyneuropathy associated with type 2 diabetes mellitus    4. Essential hypertension    .    Medications prescribed:  Outpatient Encounter Prescriptions as of 8/1/2017   Medication Sig Dispense Refill   • amLODIPine (NORVASC) 10 MG tablet Take 10 mg by mouth.     • FLUoxetine (PROZAC) 10 MG capsule TAKE 1 TABLET BY MOUTH EVERY DAY     • gabapentin (NEURONTIN) 300 MG capsule Take 300 mg by mouth.     • lisinopril (PRINIVIL,ZESTRIL) 40 MG tablet TAKE 1 TABLET BY MOUTH EVERY DAY     • ondansetron (ZOFRAN) 4 MG tablet      • acetaminophen (TYLENOL) 500 MG tablet Take 500 mg by mouth Every 6 (Six) Hours As Needed for Mild Pain (1-3).     • albuterol (PROVENTIL) (2.5 MG/3ML) 0.083% nebulizer solution Take 2.5 mg by nebulization Every 4 (Four) Hours As Needed for Wheezing.     • amLODIPine (NORVASC) 10 MG tablet Take 10 mg by mouth Every Night.     • budesonide-formoterol (SYMBICORT) 160-4.5 MCG/ACT inhaler Inhale 2 puffs 2 (Two) Times a Day As Needed.     • diphenhydrAMINE (BENADRYL) 25 mg capsule Take 25 mg by mouth Every 6 (Six) Hours As Needed for Itching.     • FLUoxetine (PROzac) 10 MG tablet Take 10 mg by mouth Every Night.     • gabapentin (NEURONTIN) 300 MG capsule Take 300 mg by mouth 3 (Three) Times a Day.     • lisinopril (PRINIVIL,ZESTRIL) 40 MG tablet Take 40 mg by mouth Every Night.     • metoprolol tartrate (LOPRESSOR) 25 MG tablet Take 25 mg by mouth 2 (Two) Times a Day.     • ondansetron (ZOFRAN) 4 MG tablet Take 4 mg by mouth Every 8 (Eight) Hours As Needed.       No facility-administered encounter medications on file as of 8/1/2017.        Orders placed during this encounter include:  Orders Placed This Encounter   Procedures   • Comprehensive Metabolic Panel   • TSH   • T4, Free   • T3   • CBC & Differential     Order Specific Question:   Manual Differential     Answer:   No     Blood Pressure Management:   Nl on lisinopril  Bronchospasm w  metoprolol         Microvascular Complication Monitoring:      Neuropathy - on Neurontin 300 mg      --     Immunizations:            Preventive Care:      Non smoker     Weight Related:   Advised to consume 500 calories less per day  Exercise 30 min daily      Bone Health               Lab Results   Component Value Date     CALCIUM 9.6 09/19/2016         Thyroid Health            Lab Results   Component Value Date     TSH <0.01 (L) 08/16/2016      Graves Disease        Component  Latest Ref Rng 6/7/2016 8/16/2016   TSH Baseline  0.46 - 4.68 uIU/ml <0.01 (L) <0.01 (L)   Free T4  0.78 - 2.19 ng/dl   5.69 (H)   T3, Total  97 - 169 ng/dl   781 (H)   Thyrotropin Receptor Antibody  0.00 - 1.75 IU/L   11.71 (H)      dx is Graves' disease     PTU 2 tabs three times daily      Labs from Oct. 2016     TSH - <0.01  Free T4- 1.01  T3- 209     Decreased PTU to 2 tablets BID --now one TID      Continue metopolol 25 mg twice daily      Need new labs today and will call      Schedule for Radioactive Iodine. After Radioactive Iodine is given there is a transient worsening phase so 5 days after CAPELLAN restart PTU for about 2 weeks then stop      We will check levels 4 weeks after CAPELLAN , will leave a lab order     Don't stop metoprolol unless heart rate drops less than 60   On the contrary if heart rate speeds up to more than 100 while resting - double it .     will make appt w Dr. Vela and with us in about 8 weeks.     CAPELLAN ablation on Feb. 23, 2017      She started back on the PTU on March 1, 2017 will stop tomorrow     Has been off PTU for one month            Lab Results   Component Value Date     TSH <0.020 (L) 04/26/2017     O7ISSQD 307.0 (H) 04/26/2017     THYROIDAB 149 (H) 10/24/2016         Lab Results   Component Value Date    TSH <0.020 (L) 06/07/2017    O1EJPBG 265.0 (H) 06/07/2017    THYROIDAB 149 (H) 10/24/2016      did not start medication will wait today and see what labs are showing     Follow up with         need new labs today      Other Diabetes Related Aspects         No results found for: [id number]            Glycemic Management    Lab Results   Component Value Date    HGBA1C 7.36 (H) 06/07/2017          Now on metformin ---- start B12 in the formal multivitamins daily     Has neuropathy and this could be related to either thyroid, diabetes or b12 Deficiency.     She is taking Neurontin     Hypertension     Norvasc 10 mg one daily               4. Follow-up: Return in about 8 weeks (around 9/26/2017) for Recheck.

## 2017-08-02 ENCOUNTER — TELEPHONE (OUTPATIENT)
Dept: ENDOCRINOLOGY | Facility: CLINIC | Age: 47
End: 2017-08-02

## 2017-08-02 NOTE — TELEPHONE ENCOUNTER
----- Message from NYLA Gibbs sent at 8/2/2017  8:55 AM CDT -----  Call patient with result- T3 is trending down, tsh still hyper ; still no medication since t3 is coming down; keep appt with  this month

## 2017-08-29 ENCOUNTER — HOSPITAL ENCOUNTER (OUTPATIENT)
Dept: RADIATION ONCOLOGY | Facility: HOSPITAL | Age: 47
Setting detail: RADIATION/ONCOLOGY SERIES
End: 2017-08-29

## 2017-08-29 ENCOUNTER — OFFICE VISIT (OUTPATIENT)
Dept: RADIATION ONCOLOGY | Facility: HOSPITAL | Age: 47
End: 2017-08-29

## 2017-08-29 VITALS
SYSTOLIC BLOOD PRESSURE: 186 MMHG | TEMPERATURE: 99.2 F | OXYGEN SATURATION: 97 % | WEIGHT: 293 LBS | HEART RATE: 77 BPM | RESPIRATION RATE: 18 BRPM | BODY MASS INDEX: 45.99 KG/M2 | HEIGHT: 67 IN | DIASTOLIC BLOOD PRESSURE: 77 MMHG

## 2017-08-29 DIAGNOSIS — E05.00 GRAVES DISEASE: ICD-10-CM

## 2017-08-29 PROCEDURE — 99212 OFFICE O/P EST SF 10 MIN: CPT | Performed by: RADIOLOGY

## 2017-08-29 PROCEDURE — G0463 HOSPITAL OUTPT CLINIC VISIT: HCPCS | Performed by: RADIOLOGY

## 2017-08-29 NOTE — PROGRESS NOTES
"Progress Note      Patient: Christen Casillas   YOB: 1970   Medical Record Number: [id number]   Date of Visit  August 29, 2017   Primary Diagnosis: Hyperthyroidism secondary to Graves’ disease [E05.90]    Ms. Christen Casillas returns to our clinic today for a routine follow-up exam. She is a 47-year-old white female who is now 6 months status post completion of radioactive iodine ablation of the thyroid for hyperthyroidism secondary to Graves’ disease. She received 9.766 mCi on 2/23/17, and return to our clinic today for her first follow-up visit since therapy. Since then, she has continued to follow closely with Dr. Siddharth Martinez and NYLA Mondragon. As seen from the labs listed below, her TSH remains undetectable, however, her T3, which was 781 prior to therapy, has now dropped to 202 (on no PTU since April).    Today on exam, patient is doing well and has no significant disease or treatment-related complaints. She does complain of fatigue and occasional difficulty breathing. She has no neck pain or swelling, and states that she has noted that the goiter has shrunk in size. She was last seen in follow-up by Monty Dudley on 8/1/17.                                    Review of Systems   Constitutional: Positive for fatigue.   Respiratory: Positive for shortness of breath.         \"at night feels like weight on chest\"   Neurological: Positive for numbness.        Neuropathy in bilateral feet, toes burn     The remainder of her review of systems is otherwise negative.    Pain:    Pain Score    08/29/17 1514   PainSc:   7   PainLoc: Foot  Comment: bilateral         Physical Exam:    Vitals:     Vitals:    08/29/17 1514   BP: (!) 186/77   Pulse: 77   Resp: 18   Temp: 99.2 °F (37.3 °C)   SpO2: 97%       Weight:   Wt Readings from Last 3 Encounters:   08/29/17 293 lb 3.2 oz (133 kg)   08/01/17 289 lb (131 kg)   06/07/17 273 lb 12.8 oz (124 kg)     Constitutional: The patient is a well-developed, " well-nourished white female in no acute distress.  Alert and oriented ×3.  Eyes: PERRLA.  EOMI.  ENMT:  Ears and nose WNL.  No lesions noted in the oral cavity or oropharynx. No masses or nodules are palpated and neck.  Lymphatics: No cervical, supraclavicular, or axillary lymphadenopathy is palpated.  CV: Regular rate and rhythm.  No murmurs, rubs, or gallops are appreciated.  Respiratory: Lungs clear to auscultation.  Breath sounds equal bilaterally.  GI: Abdomen soft, nontender, nondistended, with no hepatosplenomegaly or masses palpated.  Extremities: No clubbing, cyanosis, or edema.  Neurologic: Cranial nerves II through XII are grossly intact, with no focal neurological deficits noted on exam.  Psychiatric: Alert and oriented x3. Normal affect, with no anxiety or depression noted.    Labs:    WBC   Date Value Ref Range Status   08/01/2017 9.37 3.20 - 9.80 10*3/mm3 Final   04/26/2017 6.31 3.20 - 9.80 10*3/mm3 Final   02/06/2017 5.70 3.20 - 9.80 10*3/mm3 Final     Hemoglobin   Date Value Ref Range Status   08/01/2017 12.8 12.0 - 15.5 g/dL Final   04/26/2017 12.6 12.0 - 15.5 g/dL Final   02/06/2017 13.5 12.0 - 15.5 g/dL Final     Hematocrit   Date Value Ref Range Status   08/01/2017 39.9 35.0 - 45.0 % Final   04/26/2017 36.7 35.0 - 45.0 % Final   02/06/2017 40.7 35.0 - 45.0 % Final     Platelets   Date Value Ref Range Status   08/01/2017 160 150 - 450 10*3/mm3 Final   04/26/2017 147 (L) 150 - 450 10*3/mm3 Final   02/06/2017 155 150 - 450 10*3/mm3 Final   ]     Lab Results   Component Value Date    TSH <0.020 (L) 08/01/2017    TSH <0.020 (L) 06/07/2017    TSH <0.020 (L) 04/26/2017    TSH 0.020 (L) 02/06/2017    TSH <0.01 (L) 10/24/2016    TSH <0.01 (L) 08/16/2016    TSH <0.01 (L) 06/07/2016    FREET4 1.42 08/01/2017    FREET4 1.69 06/07/2017    FREET4 1.94 04/26/2017    FREET4 0.65 (L) 02/06/2017    FREET4 1.01 10/24/2016    FREET4 5.69 (H) 08/16/2016     Lab Results   Component Value Date    B0EVNJH 202.0 (H)  08/01/2017    N9JQIKK 265.0 (H) 06/07/2017    B1CONVO 307.0 (H) 04/26/2017    A3JPLCI 207.0 (H) 02/06/2017    N6EVNSW 209 (H) 10/24/2016    D0YIILM 781 (H) 08/16/2016       Assessment/Plan: Ms. Christen Casillas is a 47-year-old white female who is now 6 months status post completion of radioactive iodine ablation of the thyroid for hyperthyroidism secondary to Graves’ disease. Although her lab values are not to the point they need to be, her total T3 has started to drop. I would prefer to hold off on retreatment with I-131 for now, and check at least 2 more lab levels before making a decision on retreatment. The patient is scheduled to see Monty Dudley for follow-up on 9/26/17, with labs at that time. We have scheduled the patient to return to our clinic for reevaluation in 3 months, after her second set of labs. If she continues to have hyperthyroidism at that time, a second I-131 treatment may be indicated.    Rui Vela MD  Radiation Oncology    Electronically signed by Rui Vela MD  8/29/2017 3:19 PM     Cc:  Dr. Siddharth Martinez/NYLA Mondragon Dr.

## 2017-09-26 ENCOUNTER — APPOINTMENT (OUTPATIENT)
Dept: LAB | Facility: HOSPITAL | Age: 47
End: 2017-09-26

## 2017-09-26 ENCOUNTER — OFFICE VISIT (OUTPATIENT)
Dept: ENDOCRINOLOGY | Facility: CLINIC | Age: 47
End: 2017-09-26

## 2017-09-26 VITALS
WEIGHT: 293 LBS | BODY MASS INDEX: 45.99 KG/M2 | HEART RATE: 85 BPM | SYSTOLIC BLOOD PRESSURE: 124 MMHG | HEIGHT: 67 IN | DIASTOLIC BLOOD PRESSURE: 80 MMHG

## 2017-09-26 DIAGNOSIS — I10 ESSENTIAL HYPERTENSION: ICD-10-CM

## 2017-09-26 DIAGNOSIS — E11.42 DIABETIC POLYNEUROPATHY ASSOCIATED WITH TYPE 2 DIABETES MELLITUS (HCC): ICD-10-CM

## 2017-09-26 DIAGNOSIS — E05.00 GRAVES' DISEASE: Primary | ICD-10-CM

## 2017-09-26 DIAGNOSIS — E53.8 B12 DEFICIENCY: ICD-10-CM

## 2017-09-26 DIAGNOSIS — E11.9 TYPE 2 DIABETES MELLITUS WITHOUT COMPLICATION, WITHOUT LONG-TERM CURRENT USE OF INSULIN (HCC): ICD-10-CM

## 2017-09-26 LAB
T3 SERPL-MCNC: 198 NG/DL (ref 97–169)
T4 FREE SERPL-MCNC: 1.47 NG/DL (ref 0.78–2.19)
TSH SERPL DL<=0.05 MIU/L-ACNC: <0.02 MIU/ML (ref 0.46–4.68)
VIT B12 BLD-MCNC: 404 PG/ML (ref 239–931)

## 2017-09-26 PROCEDURE — 36415 COLL VENOUS BLD VENIPUNCTURE: CPT | Performed by: NURSE PRACTITIONER

## 2017-09-26 PROCEDURE — 99214 OFFICE O/P EST MOD 30 MIN: CPT | Performed by: NURSE PRACTITIONER

## 2017-09-26 PROCEDURE — 82607 VITAMIN B-12: CPT | Performed by: NURSE PRACTITIONER

## 2017-09-26 PROCEDURE — 84439 ASSAY OF FREE THYROXINE: CPT | Performed by: NURSE PRACTITIONER

## 2017-09-26 PROCEDURE — 84480 ASSAY TRIIODOTHYRONINE (T3): CPT | Performed by: NURSE PRACTITIONER

## 2017-09-26 PROCEDURE — 84443 ASSAY THYROID STIM HORMONE: CPT | Performed by: NURSE PRACTITIONER

## 2017-09-26 RX ORDER — FLUOXETINE 10 MG/1
TABLET, FILM COATED ORAL
COMMUNITY
Start: 2017-09-11 | End: 2018-02-26 | Stop reason: SDUPTHER

## 2017-09-26 RX ORDER — LISINOPRIL 40 MG/1
TABLET ORAL
COMMUNITY
Start: 2017-09-11 | End: 2018-02-26 | Stop reason: SDUPTHER

## 2017-09-26 NOTE — PROGRESS NOTES
Subjective    Christen Casillas is a 47 y.o. female. she is here today for follow-up.    History of Present Illness     Hyperthyroidism from Graves' Disease      CAPELLAN ablation on Feb. 23, 2017     Duration diagnosed in June 2016        Timing - improving     Location-she has an asymmetric right more than left sided goiter board thyroid uptake and scan shows diffuse 60% uptake. This was Reviewed by . . There is no thyroid ultrasound        Severity - at this point to mild     Complications - none        Current symptoms/problems  initially weight loss, fatigue, heat intolerance, increased bowel movements, tachycardia but now all improved     She also has goiter right, more than left. She states this has decreased in size after starting PTU     Alleviating Factors: Compliance with PTU at a dose of 100 mg 3 times daily and metoprolol at a dose of 25 mg twice daily.     Side Effects developed respiratory difficulties with Tapazole        ---     Social diagnoses of diabetes, new diagnosis June 2016 June 2016     HgbA1c 6.8%     Now on metformin BId      She does not know dosage                  The following portions of the patient's history were reviewed and updated as appropriate:   Past Medical History:   Diagnosis Date   • Depression    • Graves disease 10/24/2016   • Hypertension 10/24/2016   • SOB (shortness of breath)    • Type 2 diabetes mellitus without complication, without long-term current use of insulin 10/24/2016     Past Surgical History:   Procedure Laterality Date   • EXPLORATORY LAPAROTOMY     • TYMPANOPLASTY Left    • UMBILICAL HERNIA REPAIR N/A 3/31/2017    Procedure: UMBILICAL HERNIA REPAIR with mesh ;  Surgeon: Luke Chau MD;  Location: [facility];  Service:      Family History   Problem Relation Age of Onset   • Hypothyroidism Mother    • Skin cancer Mother    • Stomach cancer Brother    • Stomach cancer Maternal Aunt    • Breast cancer Paternal Grandmother      OB  History      Para Term  AB Living    0 0 0 0 0 0    SAB TAB Ectopic Multiple Live Births    0 0 0 0         Current Outpatient Prescriptions   Medication Sig Dispense Refill   • lisinopril (PRINIVIL,ZESTRIL) 40 MG tablet TAKE 1 TABLET BY MOUTH EVERY DAY     • acetaminophen (TYLENOL) 500 MG tablet Take 500 mg by mouth Every 6 (Six) Hours As Needed for Mild Pain (1-3).     • albuterol (PROVENTIL) (2.5 MG/3ML) 0.083% nebulizer solution Take 2.5 mg by nebulization Every 4 (Four) Hours As Needed for Wheezing.     • amLODIPine (NORVASC) 10 MG tablet Take 10 mg by mouth.     • budesonide-formoterol (SYMBICORT) 160-4.5 MCG/ACT inhaler Inhale 2 puffs 2 (Two) Times a Day As Needed.     • diphenhydrAMINE (BENADRYL) 25 mg capsule Take 25 mg by mouth Every 6 (Six) Hours As Needed for Itching.     • FLUoxetine (PROZAC) 10 MG capsule TAKE 1 TABLET BY MOUTH EVERY DAY     • FLUoxetine (PROzac) 10 MG tablet      • gabapentin (NEURONTIN) 300 MG capsule Take 300 mg by mouth.     • lisinopril (PRINIVIL,ZESTRIL) 40 MG tablet TAKE 1 TABLET BY MOUTH EVERY DAY     • metoprolol tartrate (LOPRESSOR) 25 MG tablet Take 25 mg by mouth 2 (Two) Times a Day.     • ondansetron (ZOFRAN) 4 MG tablet        No current facility-administered medications for this visit.      Allergies   Allergen Reactions   • Honey Bee Treatment [Bee Venom] Anaphylaxis   • Tapazole [Methimazole] Shortness Of Breath   • Aspirin Hives     also shortness of breath       Social History     Social History   • Marital status:      Spouse name: Timbo   • Number of children: 0   • Years of education: 12     Occupational History   • food prep      Social History Main Topics   • Smoking status: Former Smoker     Packs/day: 0.50     Years: 31.00     Types: Cigarettes     Quit date: 2011   • Smokeless tobacco: Never Used   • Alcohol use No   • Drug use: No   • Sexual activity: Defer     Other Topics Concern   • None     Social History Narrative       Review of  "Systems  Review of Systems   Constitutional: Negative for activity change, appetite change, diaphoresis and fatigue.   HENT: Negative for congestion, dental problem, drooling, facial swelling, sneezing, sore throat, tinnitus, trouble swallowing and voice change.    Eyes: Negative for photophobia, pain, discharge, redness, itching and visual disturbance.   Respiratory: Negative for apnea, cough, choking, chest tightness and shortness of breath.    Cardiovascular: Negative for chest pain, palpitations and leg swelling.   Gastrointestinal: Negative for abdominal distention, abdominal pain, constipation, diarrhea, nausea and vomiting.   Endocrine: Negative for cold intolerance, heat intolerance, polydipsia, polyphagia and polyuria.   Genitourinary: Negative for difficulty urinating, dysuria, frequency, hematuria and urgency.   Musculoskeletal: Negative for arthralgias, back pain, gait problem, joint swelling, myalgias, neck pain and neck stiffness.   Skin: Negative for color change, pallor, rash and wound.   Allergic/Immunologic: Negative for environmental allergies and immunocompromised state.   Neurological: Negative for dizziness, tremors, facial asymmetry, weakness, light-headedness, numbness and headaches.   Hematological: Negative for adenopathy. Does not bruise/bleed easily.   Psychiatric/Behavioral: Negative for agitation, behavioral problems, confusion and sleep disturbance.        Objective    /80 (BP Location: Left arm, Patient Position: Sitting, Cuff Size: Adult)  Pulse 85  Ht 67\" (170.2 cm)  Wt 295 lb (134 kg)  LMP 02/01/2016 (Exact Date)  BMI 46.2 kg/m2  Physical Exam   Constitutional: She is oriented to person, place, and time. She appears well-developed and well-nourished. No distress.   HENT:   Head: Normocephalic and atraumatic.   Right Ear: External ear normal.   Left Ear: External ear normal.   Nose: Nose normal.   Eyes: Conjunctivae and EOM are normal. Pupils are equal, round, and " reactive to light.   Neck: Normal range of motion. Neck supple. No tracheal deviation present. No thyromegaly present.   Cardiovascular: Normal rate, regular rhythm and normal heart sounds.    No murmur heard.  Pulmonary/Chest: Effort normal and breath sounds normal. No respiratory distress. She has no wheezes.   Abdominal: Soft. Bowel sounds are normal. There is no tenderness. There is no rebound and no guarding.   Musculoskeletal: Normal range of motion. She exhibits no edema, tenderness or deformity.   Neurological: She is alert and oriented to person, place, and time. No cranial nerve deficit.   Skin: Skin is warm and dry. No rash noted.   Psychiatric: She has a normal mood and affect. Her behavior is normal. Judgment and thought content normal.       Lab Review  Glucose (mg/dL)   Date Value   08/01/2017 187 (H)   06/07/2017 167 (H)   04/26/2017 199 (H)     Sodium (mmol/L)   Date Value   08/01/2017 136 (L)   06/07/2017 140   04/26/2017 136 (L)     Potassium (mmol/L)   Date Value   08/01/2017 5.1   06/07/2017 4.1   04/26/2017 4.6     Chloride (mmol/L)   Date Value   08/01/2017 98   06/07/2017 98   04/26/2017 98     CO2 (mmol/L)   Date Value   08/01/2017 30.0   06/07/2017 27.0   04/26/2017 25.0     BUN (mg/dL)   Date Value   08/01/2017 15   06/07/2017 7   04/26/2017 13     Creatinine (mg/dL)   Date Value   08/01/2017 0.70   06/07/2017 0.53   04/26/2017 0.62     Hemoglobin A1C   Date Value   06/07/2017 7.36 % (H)   02/06/2017 6.96 % (H)   06/07/2016 6.8 %TotHgb (H)     Triglycerides (mg/dl)   Date Value   06/07/2016 128       Assessment/Plan      1. Graves' disease    2. B12 deficiency    3. Type 2 diabetes mellitus without complication, without long-term current use of insulin    4. Essential hypertension    5. Diabetic polyneuropathy associated with type 2 diabetes mellitus    .    Medications prescribed:  Outpatient Encounter Prescriptions as of 9/26/2017   Medication Sig Dispense Refill   • lisinopril  (PRINIVIL,ZESTRIL) 40 MG tablet TAKE 1 TABLET BY MOUTH EVERY DAY     • acetaminophen (TYLENOL) 500 MG tablet Take 500 mg by mouth Every 6 (Six) Hours As Needed for Mild Pain (1-3).     • albuterol (PROVENTIL) (2.5 MG/3ML) 0.083% nebulizer solution Take 2.5 mg by nebulization Every 4 (Four) Hours As Needed for Wheezing.     • amLODIPine (NORVASC) 10 MG tablet Take 10 mg by mouth.     • budesonide-formoterol (SYMBICORT) 160-4.5 MCG/ACT inhaler Inhale 2 puffs 2 (Two) Times a Day As Needed.     • diphenhydrAMINE (BENADRYL) 25 mg capsule Take 25 mg by mouth Every 6 (Six) Hours As Needed for Itching.     • FLUoxetine (PROZAC) 10 MG capsule TAKE 1 TABLET BY MOUTH EVERY DAY     • FLUoxetine (PROzac) 10 MG tablet      • gabapentin (NEURONTIN) 300 MG capsule Take 300 mg by mouth.     • lisinopril (PRINIVIL,ZESTRIL) 40 MG tablet TAKE 1 TABLET BY MOUTH EVERY DAY     • metoprolol tartrate (LOPRESSOR) 25 MG tablet Take 25 mg by mouth 2 (Two) Times a Day.     • ondansetron (ZOFRAN) 4 MG tablet        No facility-administered encounter medications on file as of 9/26/2017.        Orders placed during this encounter include:  Orders Placed This Encounter   Procedures   • TSH   • T4, Free   • T3   • Vitamin B12     Blood Pressure Management:   Nl on lisinopril  Bronchospasm w metoprolol         Microvascular Complication Monitoring:      Neuropathy - on Neurontin 300 mg      --     Immunizations:            Preventive Care:      Non smoker     Weight Related:   Advised to consume 500 calories less per day  Exercise 30 min daily      Bone Health               Lab Results   Component Value Date     CALCIUM 9.6 09/19/2016         Thyroid Health            Lab Results   Component Value Date     TSH <0.01 (L) 08/16/2016      Graves Disease        Component  Latest Ref Rng 6/7/2016 8/16/2016   TSH Baseline  0.46 - 4.68 uIU/ml <0.01 (L) <0.01 (L)   Free T4  0.78 - 2.19 ng/dl   5.69 (H)   T3, Total  97 - 169 ng/dl   781 (H)   Thyrotropin  Receptor Antibody  0.00 - 1.75 IU/L   11.71 (H)      dx is Graves' disease     PTU 2 tabs three times daily      Labs from Oct. 2016     TSH - <0.01  Free T4- 1.01  T3- 209     Decreased PTU to 2 tablets BID --now one TID      Continue metopolol 25 mg twice daily      Need new labs today and will call      Schedule for Radioactive Iodine. After Radioactive Iodine is given there is a transient worsening phase so 5 days after CAPELLAN restart PTU for about 2 weeks then stop      We will check levels 4 weeks after CAPELLAN , will leave a lab order     Don't stop metoprolol unless heart rate drops less than 60   On the contrary if heart rate speeds up to more than 100 while resting - double it .     will make appt w Dr. Vela and with us in about 8 weeks.     CAPELLAN ablation on Feb. 23, 2017      She started back on the PTU on March 1, 2017 will stop tomorrow     Has been off PTU for one month                Lab Results   Component Value Date     TSH <0.020 (L) 04/26/2017     [id number] 307.0 (H) 04/26/2017     THYROIDAB 149 (H) 10/24/2016               Lab Results   Component Value Date     TSH <0.020 (L) 06/07/2017     [id number] 265.0 (H) 06/07/2017     THYROIDAB 149 (H) 10/24/2016       did not start medication will wait today and see what labs are showing      Lab Results   Component Value Date    TSH <0.020 (L) 08/01/2017    [id number] 202.0 (H) 08/01/2017    THYROIDAB 149 (H) 10/24/2016     Component      Latest Ref Rng & Units 8/1/2017   Free T4      0.78 - 2.19 ng/dL 1.42     Still off PTU not considered failure at this time      Other Diabetes Related Aspects         No results found for: [id number]            Glycemic Management           Lab Results   Component Value Date     HGBA1C 7.36 (H) 06/07/2017            Now on metformin ---- start B12 in the formal multivitamins daily     Has neuropathy and this could be related to either thyroid, diabetes or b12 Deficiency.     She is taking Neurontin     Hypertension     Norvasc 10  mg one daily            4. Follow-up: Return in about 8 weeks (around 11/21/2017) for Recheck.

## 2017-09-27 ENCOUNTER — TELEPHONE (OUTPATIENT)
Dept: ENDOCRINOLOGY | Facility: CLINIC | Age: 47
End: 2017-09-27

## 2017-09-27 NOTE — TELEPHONE ENCOUNTER
----- Message from NYLA Gibbs sent at 9/27/2017  7:47 AM CDT -----  Call patient with result--t3 still coming down so still no medication ; we will do labs when we see her in 8 weeks

## 2017-11-21 ENCOUNTER — OFFICE VISIT (OUTPATIENT)
Dept: ENDOCRINOLOGY | Facility: CLINIC | Age: 47
End: 2017-11-21

## 2017-11-21 ENCOUNTER — APPOINTMENT (OUTPATIENT)
Dept: LAB | Facility: HOSPITAL | Age: 47
End: 2017-11-21

## 2017-11-21 VITALS
DIASTOLIC BLOOD PRESSURE: 80 MMHG | BODY MASS INDEX: 45.99 KG/M2 | HEIGHT: 67 IN | HEART RATE: 80 BPM | SYSTOLIC BLOOD PRESSURE: 118 MMHG | WEIGHT: 293 LBS

## 2017-11-21 DIAGNOSIS — E11.42 DIABETIC POLYNEUROPATHY ASSOCIATED WITH TYPE 2 DIABETES MELLITUS (HCC): ICD-10-CM

## 2017-11-21 DIAGNOSIS — E05.00 GRAVES' DISEASE: ICD-10-CM

## 2017-11-21 DIAGNOSIS — I10 ESSENTIAL HYPERTENSION: ICD-10-CM

## 2017-11-21 DIAGNOSIS — E11.9 TYPE 2 DIABETES MELLITUS WITHOUT COMPLICATION, WITHOUT LONG-TERM CURRENT USE OF INSULIN (HCC): Primary | ICD-10-CM

## 2017-11-21 LAB
ALBUMIN SERPL-MCNC: 4.4 G/DL (ref 3.4–4.8)
ALBUMIN/GLOB SERPL: 1.1 G/DL (ref 1.1–1.8)
ALP SERPL-CCNC: 115 U/L (ref 38–126)
ALT SERPL W P-5'-P-CCNC: 33 U/L (ref 9–52)
ANION GAP SERPL CALCULATED.3IONS-SCNC: 12 MMOL/L (ref 5–15)
AST SERPL-CCNC: 39 U/L (ref 14–36)
BASOPHILS # BLD AUTO: 0.05 10*3/MM3 (ref 0–0.2)
BASOPHILS NFR BLD AUTO: 0.6 % (ref 0–2)
BILIRUB SERPL-MCNC: 0.5 MG/DL (ref 0.2–1.3)
BUN BLD-MCNC: 15 MG/DL (ref 7–21)
BUN/CREAT SERPL: 17 (ref 7–25)
CALCIUM SPEC-SCNC: 10 MG/DL (ref 8.4–10.2)
CHLORIDE SERPL-SCNC: 97 MMOL/L (ref 95–110)
CO2 SERPL-SCNC: 28 MMOL/L (ref 22–31)
CREAT BLD-MCNC: 0.88 MG/DL (ref 0.5–1)
DEPRECATED RDW RBC AUTO: 43.6 FL (ref 36.4–46.3)
EOSINOPHIL # BLD AUTO: 0.16 10*3/MM3 (ref 0–0.7)
EOSINOPHIL NFR BLD AUTO: 1.9 % (ref 0–7)
ERYTHROCYTE [DISTWIDTH] IN BLOOD BY AUTOMATED COUNT: 13.7 % (ref 11.5–14.5)
GFR SERPL CREATININE-BSD FRML MDRD: 69 ML/MIN/1.73 (ref 58–135)
GLOBULIN UR ELPH-MCNC: 3.9 GM/DL (ref 2.3–3.5)
GLUCOSE BLD-MCNC: 195 MG/DL (ref 60–100)
HBA1C MFR BLD: 7.7 % (ref 4–5.6)
HCT VFR BLD AUTO: 37.9 % (ref 35–45)
HGB BLD-MCNC: 12.2 G/DL (ref 12–15.5)
IMM GRANULOCYTES # BLD: 0.01 10*3/MM3 (ref 0–0.02)
IMM GRANULOCYTES NFR BLD: 0.1 % (ref 0–0.5)
LYMPHOCYTES # BLD AUTO: 2 10*3/MM3 (ref 0.6–4.2)
LYMPHOCYTES NFR BLD AUTO: 23.8 % (ref 10–50)
MCH RBC QN AUTO: 27.9 PG (ref 26.5–34)
MCHC RBC AUTO-ENTMCNC: 32.2 G/DL (ref 31.4–36)
MCV RBC AUTO: 86.5 FL (ref 80–98)
MONOCYTES # BLD AUTO: 0.58 10*3/MM3 (ref 0–0.9)
MONOCYTES NFR BLD AUTO: 6.9 % (ref 0–12)
NEUTROPHILS # BLD AUTO: 5.59 10*3/MM3 (ref 2–8.6)
NEUTROPHILS NFR BLD AUTO: 66.7 % (ref 37–80)
PLATELET # BLD AUTO: 222 10*3/MM3 (ref 150–450)
PMV BLD AUTO: 10.6 FL (ref 8–12)
POTASSIUM BLD-SCNC: 4.7 MMOL/L (ref 3.5–5.1)
PROT SERPL-MCNC: 8.3 G/DL (ref 6.3–8.6)
RBC # BLD AUTO: 4.38 10*6/MM3 (ref 3.77–5.16)
SODIUM BLD-SCNC: 137 MMOL/L (ref 137–145)
T3 SERPL-MCNC: 178 NG/DL (ref 97–169)
T4 FREE SERPL-MCNC: 1.04 NG/DL (ref 0.78–2.19)
TSH SERPL DL<=0.05 MIU/L-ACNC: <0.02 MIU/ML (ref 0.46–4.68)
WBC NRBC COR # BLD: 8.39 10*3/MM3 (ref 3.2–9.8)

## 2017-11-21 PROCEDURE — 99214 OFFICE O/P EST MOD 30 MIN: CPT | Performed by: NURSE PRACTITIONER

## 2017-11-21 PROCEDURE — 84439 ASSAY OF FREE THYROXINE: CPT | Performed by: NURSE PRACTITIONER

## 2017-11-21 PROCEDURE — 80050 GENERAL HEALTH PANEL: CPT | Performed by: NURSE PRACTITIONER

## 2017-11-21 PROCEDURE — 84480 ASSAY TRIIODOTHYRONINE (T3): CPT | Performed by: NURSE PRACTITIONER

## 2017-11-21 PROCEDURE — 36415 COLL VENOUS BLD VENIPUNCTURE: CPT | Performed by: NURSE PRACTITIONER

## 2017-11-21 PROCEDURE — 83036 HEMOGLOBIN GLYCOSYLATED A1C: CPT | Performed by: NURSE PRACTITIONER

## 2017-11-21 RX ORDER — FLUOXETINE 10 MG/1
CAPSULE ORAL
COMMUNITY
Start: 2017-10-20 | End: 2018-02-26 | Stop reason: SDUPTHER

## 2017-11-21 RX ORDER — GABAPENTIN 300 MG/1
300 CAPSULE ORAL
COMMUNITY
Start: 2017-10-20 | End: 2018-02-26 | Stop reason: SDUPTHER

## 2017-11-21 NOTE — PROGRESS NOTES
Subjective    Christen Casillas is a 47 y.o. female. she is here today for follow-up.    History of Present Illness         Hyperthyroidism from Graves' Disease      CAPELLAN ablation on Feb. 23, 2017     Duration diagnosed in June 2016        Timing - improving     Location-she has an asymmetric right more than left sided goiter board thyroid uptake and scan shows diffuse 60% uptake. This was Reviewed by . . There is no thyroid ultrasound        Severity - at this point to mild     Complications - none        Current symptoms/problems  initially weight loss, fatigue, heat intolerance, increased bowel movements, tachycardia but now all improved     She also has goiter right, more than left. She states this has decreased in size after starting PTU     Alleviating Factors: Compliance with PTU at a dose of 100 mg 3 times daily and metoprolol at a dose of 25 mg twice daily.     Side Effects developed respiratory difficulties with Tapazole        ---     Social diagnoses of diabetes, new diagnosis June 2016 June 2016     HgbA1c 6.8%     Now on metformin BId      She does not know dosage                  The following portions of the patient's history were reviewed and updated as appropriate:   Past Medical History:   Diagnosis Date   • Depression    • Graves disease 10/24/2016   • Hypertension 10/24/2016   • SOB (shortness of breath)    • Type 2 diabetes mellitus without complication, without long-term current use of insulin 10/24/2016     Past Surgical History:   Procedure Laterality Date   • EXPLORATORY LAPAROTOMY     • TYMPANOPLASTY Left    • UMBILICAL HERNIA REPAIR N/A 3/31/2017    Procedure: UMBILICAL HERNIA REPAIR with mesh ;  Surgeon: Luke Chau MD;  Location: [facility];  Service:      Family History   Problem Relation Age of Onset   • Hypothyroidism Mother    • Skin cancer Mother    • Stomach cancer Brother    • Stomach cancer Maternal Aunt    • Breast cancer Paternal Grandmother      OB  History      Para Term  AB Living    0 0 0 0 0 0    SAB TAB Ectopic Multiple Live Births    0 0 0 0         Current Outpatient Prescriptions   Medication Sig Dispense Refill   • FLUoxetine (PROZAC) 10 MG capsule TAKE 1 TABLET BY MOUTH EVERY DAY     • gabapentin (NEURONTIN) 300 MG capsule Take 300 mg by mouth.     • metoprolol tartrate (LOPRESSOR) 25 MG tablet Take 25 mg by mouth.     • acetaminophen (TYLENOL) 500 MG tablet Take 500 mg by mouth Every 6 (Six) Hours As Needed for Mild Pain (1-3).     • albuterol (PROVENTIL) (2.5 MG/3ML) 0.083% nebulizer solution Take 2.5 mg by nebulization Every 4 (Four) Hours As Needed for Wheezing.     • amLODIPine (NORVASC) 10 MG tablet Take 10 mg by mouth.     • budesonide-formoterol (SYMBICORT) 160-4.5 MCG/ACT inhaler Inhale 2 puffs 2 (Two) Times a Day As Needed.     • diphenhydrAMINE (BENADRYL) 25 mg capsule Take 25 mg by mouth Every 6 (Six) Hours As Needed for Itching.     • FLUoxetine (PROZAC) 10 MG capsule TAKE 1 TABLET BY MOUTH EVERY DAY     • FLUoxetine (PROzac) 10 MG tablet      • gabapentin (NEURONTIN) 300 MG capsule Take 300 mg by mouth.     • lisinopril (PRINIVIL,ZESTRIL) 40 MG tablet TAKE 1 TABLET BY MOUTH EVERY DAY     • lisinopril (PRINIVIL,ZESTRIL) 40 MG tablet TAKE 1 TABLET BY MOUTH EVERY DAY     • metoprolol tartrate (LOPRESSOR) 25 MG tablet Take 25 mg by mouth 2 (Two) Times a Day.     • ondansetron (ZOFRAN) 4 MG tablet        No current facility-administered medications for this visit.      Allergies   Allergen Reactions   • Honey Bee Treatment [Bee Venom] Anaphylaxis   • Tapazole [Methimazole] Shortness Of Breath   • Aspirin Hives     also shortness of breath       Social History     Social History   • Marital status:      Spouse name: Timbo   • Number of children: 0   • Years of education: 12     Occupational History   • food prep      Social History Main Topics   • Smoking status: Former Smoker     Packs/day: 0.50     Years: 31.00     Types:  "Cigarettes     Quit date: 09/2011   • Smokeless tobacco: Never Used   • Alcohol use No   • Drug use: No   • Sexual activity: Defer     Other Topics Concern   • None     Social History Narrative       Review of Systems  Review of Systems   Constitutional: Negative for activity change, appetite change, diaphoresis and fatigue.   HENT: Negative for congestion, dental problem, facial swelling, sneezing, sore throat, tinnitus, trouble swallowing and voice change.    Eyes: Negative for photophobia, pain, discharge, redness, itching and visual disturbance.   Respiratory: Negative for apnea, cough, choking, chest tightness and shortness of breath.    Cardiovascular: Negative for chest pain, palpitations and leg swelling.   Gastrointestinal: Negative for abdominal distention, abdominal pain, constipation, diarrhea, nausea and vomiting.   Endocrine: Negative for cold intolerance, heat intolerance, polydipsia, polyphagia and polyuria.   Genitourinary: Negative for difficulty urinating, dysuria, frequency, hematuria and urgency.   Musculoskeletal: Negative for arthralgias, back pain, gait problem, joint swelling, myalgias, neck pain and neck stiffness.   Skin: Negative for color change, pallor, rash and wound.   Allergic/Immunologic: Negative for environmental allergies, food allergies and immunocompromised state.   Neurological: Negative for dizziness, tremors, facial asymmetry, weakness, light-headedness, numbness and headaches.   Hematological: Negative for adenopathy. Does not bruise/bleed easily.   Psychiatric/Behavioral: Negative for agitation, behavioral problems, confusion and sleep disturbance.        Objective    /80 (BP Location: Right arm, Patient Position: Sitting, Cuff Size: Adult)  Pulse 80  Ht 67\" (170.2 cm)  Wt (!) 302 lb (137 kg)  LMP 02/01/2016 (Exact Date)  BMI 47.3 kg/m2  Physical Exam   Constitutional: She is oriented to person, place, and time. She appears well-developed and well-nourished. No " distress.   HENT:   Head: Normocephalic and atraumatic.   Right Ear: External ear normal.   Left Ear: External ear normal.   Nose: Nose normal.   Eyes: Conjunctivae and EOM are normal. Pupils are equal, round, and reactive to light.   Neck: Normal range of motion. Neck supple. No tracheal deviation present. No thyromegaly present.   Cardiovascular: Normal rate, regular rhythm and normal heart sounds.    No murmur heard.  Pulmonary/Chest: Effort normal and breath sounds normal. No respiratory distress. She has no wheezes.   Abdominal: Soft. Bowel sounds are normal. There is no tenderness. There is no rebound and no guarding.   Musculoskeletal: Normal range of motion. She exhibits no edema, tenderness or deformity.   Neurological: She is alert and oriented to person, place, and time. No cranial nerve deficit.   Skin: Skin is warm and dry. No rash noted.   Psychiatric: She has a normal mood and affect. Her behavior is normal. Judgment and thought content normal.       Lab Review  Glucose (mg/dL)   Date Value   08/01/2017 187 (H)   06/07/2017 167 (H)   04/26/2017 199 (H)     Sodium (mmol/L)   Date Value   08/01/2017 136 (L)   06/07/2017 140   04/26/2017 136 (L)     Potassium (mmol/L)   Date Value   08/01/2017 5.1   06/07/2017 4.1   04/26/2017 4.6     Chloride (mmol/L)   Date Value   08/01/2017 98   06/07/2017 98   04/26/2017 98     CO2 (mmol/L)   Date Value   08/01/2017 30.0   06/07/2017 27.0   04/26/2017 25.0     BUN (mg/dL)   Date Value   08/01/2017 15   06/07/2017 7   04/26/2017 13     Creatinine (mg/dL)   Date Value   08/01/2017 0.70   06/07/2017 0.53   04/26/2017 0.62     Hemoglobin A1C   Date Value   06/07/2017 7.36 % (H)   02/06/2017 6.96 % (H)   06/07/2016 6.8 %TotHgb (H)     Triglycerides (mg/dl)   Date Value   06/07/2016 128       Assessment/Plan      1. Type 2 diabetes mellitus without complication, without long-term current use of insulin    2. Graves' disease    3. Diabetic polyneuropathy associated with  type 2 diabetes mellitus    4. Essential hypertension    .    Medications prescribed:  Outpatient Encounter Prescriptions as of 11/21/2017   Medication Sig Dispense Refill   • FLUoxetine (PROZAC) 10 MG capsule TAKE 1 TABLET BY MOUTH EVERY DAY     • gabapentin (NEURONTIN) 300 MG capsule Take 300 mg by mouth.     • metoprolol tartrate (LOPRESSOR) 25 MG tablet Take 25 mg by mouth.     • acetaminophen (TYLENOL) 500 MG tablet Take 500 mg by mouth Every 6 (Six) Hours As Needed for Mild Pain (1-3).     • albuterol (PROVENTIL) (2.5 MG/3ML) 0.083% nebulizer solution Take 2.5 mg by nebulization Every 4 (Four) Hours As Needed for Wheezing.     • amLODIPine (NORVASC) 10 MG tablet Take 10 mg by mouth.     • budesonide-formoterol (SYMBICORT) 160-4.5 MCG/ACT inhaler Inhale 2 puffs 2 (Two) Times a Day As Needed.     • diphenhydrAMINE (BENADRYL) 25 mg capsule Take 25 mg by mouth Every 6 (Six) Hours As Needed for Itching.     • FLUoxetine (PROZAC) 10 MG capsule TAKE 1 TABLET BY MOUTH EVERY DAY     • FLUoxetine (PROzac) 10 MG tablet      • gabapentin (NEURONTIN) 300 MG capsule Take 300 mg by mouth.     • lisinopril (PRINIVIL,ZESTRIL) 40 MG tablet TAKE 1 TABLET BY MOUTH EVERY DAY     • lisinopril (PRINIVIL,ZESTRIL) 40 MG tablet TAKE 1 TABLET BY MOUTH EVERY DAY     • metoprolol tartrate (LOPRESSOR) 25 MG tablet Take 25 mg by mouth 2 (Two) Times a Day.     • ondansetron (ZOFRAN) 4 MG tablet        No facility-administered encounter medications on file as of 11/21/2017.        Orders placed during this encounter include:  Orders Placed This Encounter   Procedures   • Comprehensive Metabolic Panel   • TSH   • Hemoglobin A1c   • T4, Free   • T3   • CBC & Differential     Order Specific Question:   Manual Differential     Answer:   No     Blood Pressure Management:   Nl on lisinopril  Bronchospasm w metoprolol         Microvascular Complication Monitoring:      Neuropathy - on Neurontin 300 mg      --     Immunizations:            Preventive  Care:      Non smoker     Weight Related:   Advised to consume 500 calories less per day  Exercise 30 min daily      Bone Health               Lab Results   Component Value Date     CALCIUM 9.6 09/19/2016         Thyroid Health            Lab Results   Component Value Date     TSH <0.01 (L) 08/16/2016      Graves Disease        Component  Latest Ref Rng 6/7/2016 8/16/2016   TSH Baseline  0.46 - 4.68 uIU/ml <0.01 (L) <0.01 (L)   Free T4  0.78 - 2.19 ng/dl   5.69 (H)   T3, Total  97 - 169 ng/dl   781 (H)   Thyrotropin Receptor Antibody  0.00 - 1.75 IU/L   11.71 (H)      dx is Graves' disease     PTU 2 tabs three times daily      Labs from Oct. 2016     TSH - <0.01  Free T4- 1.01  T3- 209     Decreased PTU to 2 tablets BID --now one TID      Continue metopolol 25 mg twice daily      Need new labs today and will call      Schedule for Radioactive Iodine. After Radioactive Iodine is given there is a transient worsening phase so 5 days after CAPELLAN restart PTU for about 2 weeks then stop      We will check levels 4 weeks after CAPELLAN , will leave a lab order     Don't stop metoprolol unless heart rate drops less than 60   On the contrary if heart rate speeds up to more than 100 while resting - double it .     will make appt w Dr. Vela and with us in about 8 weeks.     CAPELLAN ablation on Feb. 23, 2017      She started back on the PTU on March 1, 2017 will stop tomorrow     Has been off PTU for one month                Lab Results   Component Value Date     TSH <0.020 (L) 04/26/2017     [id number] 307.0 (H) 04/26/2017     THYROIDAB 149 (H) 10/24/2016                   Lab Results   Component Value Date     TSH <0.020 (L) 06/07/2017     [id number] 265.0 (H) 06/07/2017     THYROIDAB 149 (H) 10/24/2016       did not start medication will wait today and see what labs are showing            Lab Results   Component Value Date     TSH <0.020 (L) 08/01/2017     [id number] 202.0 (H) 08/01/2017     THYROIDAB 149 (H) 10/24/2016      Component       Latest Ref Rng & Units 8/1/2017   Free T4      0.78 - 2.19 ng/dL 1.42      Still off PTU not considered failure at this time       Lab Results   Component Value Date    TSH <0.020 (L) 09/26/2017    [id number] 198.0 (H) 09/26/2017    THYROIDAB 149 (H) 10/24/2016        Component      Latest Ref Rng & Units 9/26/2017   Free T4      0.78 - 2.19 ng/dL 1.47     Other Diabetes Related Aspects           No results found for: [id number]            Glycemic Management               Lab Results   Component Value Date     HGBA1C 7.36 (H) 06/07/2017            Now on metformin ---- start B12 in the formal multivitamins daily     Has neuropathy and this could be related to either thyroid, diabetes or b12 Deficiency.     She is taking Neurontin     Hypertension     Norvasc 10 mg one daily         Labs today     4. Follow-up: Return in about 2 months (around 1/21/2018) for Recheck.

## 2017-11-22 ENCOUNTER — TELEPHONE (OUTPATIENT)
Dept: ENDOCRINOLOGY | Facility: CLINIC | Age: 47
End: 2017-11-22

## 2017-11-22 NOTE — TELEPHONE ENCOUNTER
----- Message from NYLA Gibbs sent at 11/22/2017  7:52 AM CST -----  Call patient with result---A1c was 7.7 should be 7 or less; the T4 is still normal and T3 still trending down

## 2017-11-29 ENCOUNTER — TELEPHONE (OUTPATIENT)
Dept: RADIATION ONCOLOGY | Facility: HOSPITAL | Age: 47
End: 2017-11-29

## 2017-11-29 NOTE — TELEPHONE ENCOUNTER
I left message on cell phone that appt tomorrow 11/30/17 was cancelled with Monty REDMOND knowledge.I gave her [facility] Phone Number to call me with any questions

## 2018-02-26 ENCOUNTER — OFFICE VISIT (OUTPATIENT)
Dept: ENDOCRINOLOGY | Facility: CLINIC | Age: 48
End: 2018-02-26

## 2018-02-26 ENCOUNTER — APPOINTMENT (OUTPATIENT)
Dept: LAB | Facility: HOSPITAL | Age: 48
End: 2018-02-26

## 2018-02-26 VITALS
BODY MASS INDEX: 45.99 KG/M2 | SYSTOLIC BLOOD PRESSURE: 122 MMHG | DIASTOLIC BLOOD PRESSURE: 68 MMHG | HEART RATE: 70 BPM | HEIGHT: 67 IN | WEIGHT: 293 LBS

## 2018-02-26 DIAGNOSIS — E05.00 GRAVES DISEASE: Primary | ICD-10-CM

## 2018-02-26 DIAGNOSIS — E05.00 GRAVES' DISEASE: Primary | ICD-10-CM

## 2018-02-26 DIAGNOSIS — E11.42 DIABETIC POLYNEUROPATHY ASSOCIATED WITH TYPE 2 DIABETES MELLITUS (HCC): ICD-10-CM

## 2018-02-26 DIAGNOSIS — I10 ESSENTIAL HYPERTENSION: ICD-10-CM

## 2018-02-26 DIAGNOSIS — E11.9 TYPE 2 DIABETES MELLITUS WITHOUT COMPLICATION, WITHOUT LONG-TERM CURRENT USE OF INSULIN (HCC): ICD-10-CM

## 2018-02-26 LAB
ALBUMIN SERPL-MCNC: 4.2 G/DL (ref 3.4–4.8)
ALBUMIN/GLOB SERPL: 1 G/DL (ref 1.1–1.8)
ALP SERPL-CCNC: 145 U/L (ref 38–126)
ALT SERPL W P-5'-P-CCNC: 30 U/L (ref 9–52)
ANION GAP SERPL CALCULATED.3IONS-SCNC: 16 MMOL/L (ref 5–15)
AST SERPL-CCNC: 28 U/L (ref 14–36)
BASOPHILS # BLD AUTO: 0.06 10*3/MM3 (ref 0–0.2)
BASOPHILS NFR BLD AUTO: 1 % (ref 0–2)
BILIRUB SERPL-MCNC: 0.6 MG/DL (ref 0.2–1.3)
BUN BLD-MCNC: 9 MG/DL (ref 7–21)
BUN/CREAT SERPL: 15.3 (ref 7–25)
CALCIUM SPEC-SCNC: 9 MG/DL (ref 8.4–10.2)
CHLORIDE SERPL-SCNC: 97 MMOL/L (ref 95–110)
CO2 SERPL-SCNC: 26 MMOL/L (ref 22–31)
CREAT BLD-MCNC: 0.59 MG/DL (ref 0.5–1)
DEPRECATED RDW RBC AUTO: 43.4 FL (ref 36.4–46.3)
EOSINOPHIL # BLD AUTO: 0.14 10*3/MM3 (ref 0–0.7)
EOSINOPHIL NFR BLD AUTO: 2.2 % (ref 0–7)
ERYTHROCYTE [DISTWIDTH] IN BLOOD BY AUTOMATED COUNT: 14.5 % (ref 11.5–14.5)
GFR SERPL CREATININE-BSD FRML MDRD: 108 ML/MIN/1.73 (ref 58–135)
GLOBULIN UR ELPH-MCNC: 4.2 GM/DL (ref 2.3–3.5)
GLUCOSE BLD-MCNC: 257 MG/DL (ref 60–100)
HBA1C MFR BLD: 8.5 % (ref 4–5.6)
HCT VFR BLD AUTO: 36.3 % (ref 35–45)
HGB BLD-MCNC: 11 G/DL (ref 12–15.5)
IMM GRANULOCYTES # BLD: 0.02 10*3/MM3 (ref 0–0.02)
IMM GRANULOCYTES NFR BLD: 0.3 % (ref 0–0.5)
LYMPHOCYTES # BLD AUTO: 1.33 10*3/MM3 (ref 0.6–4.2)
LYMPHOCYTES NFR BLD AUTO: 21.3 % (ref 10–50)
MCH RBC QN AUTO: 25.1 PG (ref 26.5–34)
MCHC RBC AUTO-ENTMCNC: 30.3 G/DL (ref 31.4–36)
MCV RBC AUTO: 82.9 FL (ref 80–98)
MONOCYTES # BLD AUTO: 0.38 10*3/MM3 (ref 0–0.9)
MONOCYTES NFR BLD AUTO: 6.1 % (ref 0–12)
NEUTROPHILS # BLD AUTO: 4.3 10*3/MM3 (ref 2–8.6)
NEUTROPHILS NFR BLD AUTO: 69.1 % (ref 37–80)
PLATELET # BLD AUTO: 182 10*3/MM3 (ref 150–450)
PMV BLD AUTO: 10.7 FL (ref 8–12)
POTASSIUM BLD-SCNC: 4.4 MMOL/L (ref 3.5–5.1)
PROT SERPL-MCNC: 8.4 G/DL (ref 6.3–8.6)
RBC # BLD AUTO: 4.38 10*6/MM3 (ref 3.77–5.16)
SODIUM BLD-SCNC: 139 MMOL/L (ref 137–145)
T3 SERPL-MCNC: 163 NG/DL (ref 97–169)
T4 FREE SERPL-MCNC: 0.86 NG/DL (ref 0.78–2.19)
TSH SERPL DL<=0.05 MIU/L-ACNC: 0.35 MIU/ML (ref 0.46–4.68)
WBC NRBC COR # BLD: 6.23 10*3/MM3 (ref 3.2–9.8)

## 2018-02-26 PROCEDURE — 83036 HEMOGLOBIN GLYCOSYLATED A1C: CPT | Performed by: NURSE PRACTITIONER

## 2018-02-26 PROCEDURE — 99214 OFFICE O/P EST MOD 30 MIN: CPT | Performed by: NURSE PRACTITIONER

## 2018-02-26 PROCEDURE — 80050 GENERAL HEALTH PANEL: CPT | Performed by: NURSE PRACTITIONER

## 2018-02-26 PROCEDURE — 84439 ASSAY OF FREE THYROXINE: CPT | Performed by: NURSE PRACTITIONER

## 2018-02-26 PROCEDURE — 36415 COLL VENOUS BLD VENIPUNCTURE: CPT | Performed by: NURSE PRACTITIONER

## 2018-02-26 PROCEDURE — 84480 ASSAY TRIIODOTHYRONINE (T3): CPT | Performed by: NURSE PRACTITIONER

## 2018-02-26 NOTE — PROGRESS NOTES
Subjective    Christen Casillas is a 48 y.o. female. she is here today for follow-up.    History of Present Illness       Hyperthyroidism from Graves' Disease      CAPELLAN ablation on Feb. 23, 2017     Duration diagnosed in June 2016        Timing - improving     Location-she has an asymmetric right more than left sided goiter board thyroid uptake and scan shows diffuse 60% uptake. This was Reviewed by . . There is no thyroid ultrasound        Severity - at this point to mild     Complications - none        Current symptoms/problems  initially weight loss, fatigue, heat intolerance, increased bowel movements, tachycardia but now all improved     She also has goiter right, more than left. She states this has decreased in size after starting PTU     Alleviating Factors: Compliance with PTU at a dose of 100 mg 3 times daily and metoprolol at a dose of 25 mg twice daily.     Side Effects developed respiratory difficulties with Tapazole        ---     Social diagnoses of diabetes, new diagnosis June 2016 June 2016     HgbA1c 6.8%     Now on metformin BId      She does not know dosage         The following portions of the patient's history were reviewed and updated as appropriate:   Past Medical History:   Diagnosis Date   • Depression    • Graves disease 10/24/2016   • Hypertension 10/24/2016   • SOB (shortness of breath)    • Type 2 diabetes mellitus without complication, without long-term current use of insulin 10/24/2016     Past Surgical History:   Procedure Laterality Date   • EXPLORATORY LAPAROTOMY     • TYMPANOPLASTY Left    • UMBILICAL HERNIA REPAIR N/A 3/31/2017    Procedure: UMBILICAL HERNIA REPAIR with mesh ;  Surgeon: Luke Chau MD;  Location: [facility];  Service:      Family History   Problem Relation Age of Onset   • Hypothyroidism Mother    • Skin cancer Mother    • Stomach cancer Brother    • Stomach cancer Maternal Aunt    • Breast cancer Paternal Grandmother      OB History       Para Term  AB Living    0 0 0 0 0 0    SAB TAB Ectopic Multiple Live Births    0 0 0 0         Current Outpatient Prescriptions   Medication Sig Dispense Refill   • acetaminophen (TYLENOL) 500 MG tablet Take 500 mg by mouth Every 6 (Six) Hours As Needed for Mild Pain (1-3).     • albuterol (PROVENTIL) (2.5 MG/3ML) 0.083% nebulizer solution Take 2.5 mg by nebulization Every 4 (Four) Hours As Needed for Wheezing.     • amLODIPine (NORVASC) 10 MG tablet Take 10 mg by mouth.     • budesonide-formoterol (SYMBICORT) 160-4.5 MCG/ACT inhaler Inhale 2 puffs 2 (Two) Times a Day As Needed.     • diphenhydrAMINE (BENADRYL) 25 mg capsule Take 25 mg by mouth Every 6 (Six) Hours As Needed for Itching.     • FLUoxetine (PROZAC) 10 MG capsule TAKE 1 TABLET BY MOUTH EVERY DAY     • gabapentin (NEURONTIN) 300 MG capsule Take 300 mg by mouth.     • lisinopril (PRINIVIL,ZESTRIL) 40 MG tablet TAKE 1 TABLET BY MOUTH EVERY DAY     • metoprolol tartrate (LOPRESSOR) 25 MG tablet Take 25 mg by mouth 2 (Two) Times a Day.     • ondansetron (ZOFRAN) 4 MG tablet        No current facility-administered medications for this visit.      Allergies   Allergen Reactions   • Honey Bee Treatment [Bee Venom] Anaphylaxis   • Tapazole [Methimazole] Shortness Of Breath   • Aspirin Hives     also shortness of breath       Social History     Social History   • Marital status:      Spouse name: Timbo   • Number of children: 0   • Years of education: 12     Occupational History   • food prep      Social History Main Topics   • Smoking status: Former Smoker     Packs/day: 0.50     Years: 31.00     Types: Cigarettes     Quit date: 2011   • Smokeless tobacco: Never Used   • Alcohol use No   • Drug use: No   • Sexual activity: Defer     Other Topics Concern   • None     Social History Narrative       Review of Systems  Review of Systems   Constitutional: Negative for activity change, appetite change, diaphoresis and fatigue.   HENT: Negative  "for facial swelling, sneezing, sore throat, tinnitus, trouble swallowing and voice change.    Eyes: Negative for photophobia, pain, discharge, redness, itching and visual disturbance.   Respiratory: Negative for apnea, cough, choking, chest tightness and shortness of breath.    Cardiovascular: Negative for chest pain, palpitations and leg swelling.   Gastrointestinal: Negative for abdominal distention, abdominal pain, constipation, diarrhea, nausea and vomiting.   Endocrine: Negative for cold intolerance, heat intolerance, polydipsia, polyphagia and polyuria.   Genitourinary: Negative for difficulty urinating, dysuria, frequency, hematuria and urgency.   Musculoskeletal: Negative for arthralgias, back pain, gait problem, joint swelling, myalgias, neck pain and neck stiffness.   Skin: Negative for color change, pallor, rash and wound.   Neurological: Negative for dizziness, tremors, weakness, light-headedness, numbness and headaches.   Hematological: Negative for adenopathy. Does not bruise/bleed easily.   Psychiatric/Behavioral: Negative for behavioral problems, confusion and sleep disturbance.        Objective    /68 (BP Location: Right arm, Patient Position: Sitting, Cuff Size: Adult)  Pulse 70  Ht 170.2 cm (67\")  Wt (!) 138 kg (304 lb)  LMP 02/01/2016 (Exact Date)  BMI 47.61 kg/m2  Physical Exam   Constitutional: She is oriented to person, place, and time. She appears well-developed and well-nourished. No distress.   HENT:   Head: Normocephalic and atraumatic.   Right Ear: External ear normal.   Left Ear: External ear normal.   Nose: Nose normal.   Eyes: Conjunctivae and EOM are normal. Pupils are equal, round, and reactive to light.   Neck: Normal range of motion. Neck supple. No tracheal deviation present. No thyromegaly present.   Cardiovascular: Normal rate, regular rhythm and normal heart sounds.    No murmur heard.  Pulmonary/Chest: Effort normal and breath sounds normal. No respiratory distress. " She has no wheezes.   Abdominal: Soft. Bowel sounds are normal. There is no tenderness. There is no rebound and no guarding.   Musculoskeletal: Normal range of motion. She exhibits no edema, tenderness or deformity.   Neurological: She is alert and oriented to person, place, and time. No cranial nerve deficit.   Skin: Skin is warm and dry. No rash noted.   Psychiatric: She has a normal mood and affect. Her behavior is normal. Judgment and thought content normal.       Lab Review  Glucose (mg/dL)   Date Value   11/21/2017 195 (H)   08/01/2017 187 (H)   06/07/2017 167 (H)     Sodium (mmol/L)   Date Value   11/21/2017 137   08/01/2017 136 (L)   06/07/2017 140     Potassium (mmol/L)   Date Value   11/21/2017 4.7   08/01/2017 5.1   06/07/2017 4.1     Chloride (mmol/L)   Date Value   11/21/2017 97   08/01/2017 98   06/07/2017 98     CO2 (mmol/L)   Date Value   11/21/2017 28.0   08/01/2017 30.0   06/07/2017 27.0     BUN (mg/dL)   Date Value   11/21/2017 15   08/01/2017 15   06/07/2017 7     Creatinine (mg/dL)   Date Value   11/21/2017 0.88   08/01/2017 0.70   06/07/2017 0.53     Hemoglobin A1C (%)   Date Value   11/21/2017 7.7 (H)   06/07/2017 7.36 (H)   02/06/2017 6.96 (H)     Triglycerides (mg/dl)   Date Value   06/07/2016 128     LDLCALC ELECT (mg/dl)   Date Value   06/07/2016 69       Assessment/Plan      1. Graves disease    2. Type 2 diabetes mellitus without complication, without long-term current use of insulin    3. Diabetic polyneuropathy associated with type 2 diabetes mellitus    4. Essential hypertension    .    Medications prescribed:  Outpatient Encounter Prescriptions as of 2/26/2018   Medication Sig Dispense Refill   • acetaminophen (TYLENOL) 500 MG tablet Take 500 mg by mouth Every 6 (Six) Hours As Needed for Mild Pain (1-3).     • albuterol (PROVENTIL) (2.5 MG/3ML) 0.083% nebulizer solution Take 2.5 mg by nebulization Every 4 (Four) Hours As Needed for Wheezing.     • amLODIPine (NORVASC) 10 MG tablet  Take 10 mg by mouth.     • budesonide-formoterol (SYMBICORT) 160-4.5 MCG/ACT inhaler Inhale 2 puffs 2 (Two) Times a Day As Needed.     • diphenhydrAMINE (BENADRYL) 25 mg capsule Take 25 mg by mouth Every 6 (Six) Hours As Needed for Itching.     • FLUoxetine (PROZAC) 10 MG capsule TAKE 1 TABLET BY MOUTH EVERY DAY     • gabapentin (NEURONTIN) 300 MG capsule Take 300 mg by mouth.     • lisinopril (PRINIVIL,ZESTRIL) 40 MG tablet TAKE 1 TABLET BY MOUTH EVERY DAY     • metoprolol tartrate (LOPRESSOR) 25 MG tablet Take 25 mg by mouth 2 (Two) Times a Day.     • ondansetron (ZOFRAN) 4 MG tablet      • [DISCONTINUED] FLUoxetine (PROZAC) 10 MG capsule TAKE 1 TABLET BY MOUTH EVERY DAY     • [DISCONTINUED] FLUoxetine (PROzac) 10 MG tablet      • [DISCONTINUED] gabapentin (NEURONTIN) 300 MG capsule Take 300 mg by mouth.     • [DISCONTINUED] lisinopril (PRINIVIL,ZESTRIL) 40 MG tablet TAKE 1 TABLET BY MOUTH EVERY DAY     • [DISCONTINUED] metoprolol tartrate (LOPRESSOR) 25 MG tablet Take 25 mg by mouth.       No facility-administered encounter medications on file as of 2/26/2018.        Orders placed during this encounter include:  Orders Placed This Encounter   Procedures   • Comprehensive Metabolic Panel   • TSH   • Hemoglobin A1c   • T4, Free   • T3   • CBC & Differential     Order Specific Question:   Manual Differential     Answer:   No     Blood Pressure Management:   Nl on lisinopril  Bronchospasm w metoprolol         Microvascular Complication Monitoring:      Neuropathy - on Neurontin 300 mg      --     Immunizations:            Preventive Care:      Non smoker     Weight Related:   Advised to consume 500 calories less per day  Exercise 30 min daily      Bone Health               Lab Results   Component Value Date     CALCIUM 9.6 09/19/2016         Thyroid Health            Lab Results   Component Value Date     TSH <0.01 (L) 08/16/2016      Graves Disease        Component  Latest Ref Rng 6/7/2016 8/16/2016   TSH  Baseline  0.46 - 4.68 uIU/ml <0.01 (L) <0.01 (L)   Free T4  0.78 - 2.19 ng/dl   5.69 (H)   T3, Total  97 - 169 ng/dl   781 (H)   Thyrotropin Receptor Antibody  0.00 - 1.75 IU/L   11.71 (H)      dx is Graves' disease     PTU 2 tabs three times daily      Labs from Oct. 2016     TSH - <0.01  Free T4- 1.01  T3- 209     Decreased PTU to 2 tablets BID --now one TID      Continue metopolol 25 mg twice daily      Need new labs today and will call      Schedule for Radioactive Iodine. After Radioactive Iodine is given there is a transient worsening phase so 5 days after CAPELLAN restart PTU for about 2 weeks then stop      We will check levels 4 weeks after CAPELLAN , will leave a lab order     Don't stop metoprolol unless heart rate drops less than 60   On the contrary if heart rate speeds up to more than 100 while resting - double it .     will make appt w Dr. Vela and with us in about 8 weeks.     CAPELLAN ablation on Feb. 23, 2017      She started back on the PTU on March 1, 2017 will stop tomorrow     Has been off PTU for one month                Lab Results   Component Value Date     TSH <0.020 (L) 04/26/2017     A3EAEUA 307.0 (H) 04/26/2017     THYROIDAB 149 (H) 10/24/2016                   Lab Results   Component Value Date     TSH <0.020 (L) 06/07/2017     L0JNNZO 265.0 (H) 06/07/2017     THYROIDAB 149 (H) 10/24/2016       did not start medication will wait today and see what labs are showing                Lab Results   Component Value Date     TSH <0.020 (L) 08/01/2017     S1ZAXVD 202.0 (H) 08/01/2017     THYROIDAB 149 (H) 10/24/2016      Component      Latest Ref Rng & Units 8/1/2017   Free T4      0.78 - 2.19 ng/dL 1.42      Still off PTU not considered failure at this time               Lab Results   Component Value Date     TSH <0.020 (L) 09/26/2017     H3ZTCXZ 198.0 (H) 09/26/2017     THYROIDAB 149 (H) 10/24/2016         Component      Latest Ref Rng & Units 9/26/2017   Free T4      0.78 - 2.19 ng/dL 1.47      Other  Diabetes Related Aspects              No results found for: [id number]            Glycemic Management    Lab Results   Component Value Date    HGBA1C 7.7 (H) 11/21/2017                Now on metformin ---- stopped       start B12 in the formal multivitamins daily     Has neuropathy and this could be related to either thyroid, diabetes or b12 Deficiency.     She is taking Neurontin     Hypertension     Norvasc 10 mg one daily        Weight management        BMI --  47.6     Dietary changes     Diet handout on diabetes and nutrition       4. Follow-up: Return in about 3 months (around 5/26/2018) for Recheck.

## 2018-02-26 NOTE — PATIENT INSTRUCTIONS
Diabetes Mellitus and Food  It is important for you to manage your blood sugar (glucose) level. Your blood glucose level can be greatly affected by what you eat. Eating healthier foods in the appropriate amounts throughout the day at about the same time each day will help you control your blood glucose level. It can also help slow or prevent worsening of your diabetes mellitus. Healthy eating may even help you improve the level of your blood pressure and reach or maintain a healthy weight.  General recommendations for healthful eating and cooking habits include:  · Eating meals and snacks regularly. Avoid going long periods of time without eating to lose weight.  · Eating a diet that consists mainly of plant-based foods, such as fruits, vegetables, nuts, legumes, and whole grains.  · Using low-heat cooking methods, such as baking, instead of high-heat cooking methods, such as deep frying.  Work with your dietitian to make sure you understand how to use the Nutrition Facts information on food labels.  How can food affect me?  Carbohydrates   Carbohydrates affect your blood glucose level more than any other type of food. Your dietitian will help you determine how many carbohydrates to eat at each meal and teach you how to count carbohydrates. Counting carbohydrates is important to keep your blood glucose at a healthy level, especially if you are using insulin or taking certain medicines for diabetes mellitus.  Alcohol   Alcohol can cause sudden decreases in blood glucose (hypoglycemia), especially if you use insulin or take certain medicines for diabetes mellitus. Hypoglycemia can be a life-threatening condition. Symptoms of hypoglycemia (sleepiness, dizziness, and disorientation) are similar to symptoms of having too much alcohol.  If your health care provider has given you approval to drink alcohol, do so in moderation and use the following guidelines:  · Women should not have more than one drink per day, and men  should not have more than two drinks per day. One drink is equal to:  ¨ 12 oz of beer.  ¨ 5 oz of wine.  ¨ 1½ oz of hard liquor.  · Do not drink on an empty stomach.  · Keep yourself hydrated. Have water, diet soda, or unsweetened iced tea.  · Regular soda, juice, and other mixers might contain a lot of carbohydrates and should be counted.  What foods are not recommended?  As you make food choices, it is important to remember that all foods are not the same. Some foods have fewer nutrients per serving than other foods, even though they might have the same number of calories or carbohydrates. It is difficult to get your body what it needs when you eat foods with fewer nutrients. Examples of foods that you should avoid that are high in calories and carbohydrates but low in nutrients include:  · Trans fats (most processed foods list trans fats on the Nutrition Facts label).  · Regular soda.  · Juice.  · Candy.  · Sweets, such as cake, pie, doughnuts, and cookies.  · Fried foods.  What foods can I eat?  Eat nutrient-rich foods, which will nourish your body and keep you healthy. The food you should eat also will depend on several factors, including:  · The calories you need.  · The medicines you take.  · Your weight.  · Your blood glucose level.  · Your blood pressure level.  · Your cholesterol level.  You should eat a variety of foods, including:  · Protein.  ¨ Lean cuts of meat.  ¨ Proteins low in saturated fats, such as fish, egg whites, and beans. Avoid processed meats.  · Fruits and vegetables.  ¨ Fruits and vegetables that may help control blood glucose levels, such as apples, mangoes, and yams.  · Dairy products.  ¨ Choose fat-free or low-fat dairy products, such as milk, yogurt, and cheese.  · Grains, bread, pasta, and rice.  ¨ Choose whole grain products, such as multigrain bread, whole oats, and brown rice. These foods may help control blood pressure.  · Fats.  ¨ Foods containing healthful fats, such as nuts,  avocado, olive oil, canola oil, and fish.  Does everyone with diabetes mellitus have the same meal plan?  Because every person with diabetes mellitus is different, there is not one meal plan that works for everyone. It is very important that you meet with a dietitian who will help you create a meal plan that is just right for you.  This information is not intended to replace advice given to you by your health care provider. Make sure you discuss any questions you have with your health care provider.  Document Released: 09/14/2006 Document Revised: 05/25/2017 Document Reviewed: 11/14/2014  [employer] Interactive Patient Education © 2017 [employer]

## 2018-02-28 ENCOUNTER — TELEPHONE (OUTPATIENT)
Dept: ENDOCRINOLOGY | Facility: CLINIC | Age: 48
End: 2018-02-28

## 2018-02-28 NOTE — TELEPHONE ENCOUNTER
----- Message from NYLA Gibbs sent at 2/26/2018  1:58 PM CST -----  t3 is now normal and T3 is 0.35 -- I need her to do labs again in 4 weeks she does not have to come in but I need to see if she coverts to hypothyroid

## 2018-02-28 NOTE — TELEPHONE ENCOUNTER
----- Message from NYLA Gibbs sent at 2/26/2018 12:54 PM CST -----  A1c was 8.5; recommend restarting metformin 500mg one with supper 1st week, then one breakfast and supper; then two breakfast and supper and start januvia 100 mg one with breakfast ; thyroid not resulted yet

## 2018-03-01 ENCOUNTER — TELEPHONE (OUTPATIENT)
Dept: ENDOCRINOLOGY | Facility: CLINIC | Age: 48
End: 2018-03-01

## 2018-03-01 NOTE — TELEPHONE ENCOUNTER
Status   Sent to [facility]   Next Steps   The plan will fax you a determination, typically within 1 to 5 business days.  How do I follow up?   DrugJanuvia 100MG tablets   Form[place]care [place] Medicaid Coverage Determination Form[place] Medicaid Prior Authorization Form for Coverage Determination [phone]   Original Claim [id number]

## 2018-05-07 RX ORDER — SITAGLIPTIN 100 MG/1
100 TABLET, FILM COATED ORAL DAILY
Qty: 30 TABLET | Refills: 5 | Status: SHIPPED | OUTPATIENT
Start: 2018-05-07 | End: 2018-10-08 | Stop reason: SDUPTHER

## 2018-05-24 ENCOUNTER — OFFICE VISIT (OUTPATIENT)
Dept: CARDIAC SURGERY | Facility: CLINIC | Age: 48
End: 2018-05-24

## 2018-05-24 VITALS
DIASTOLIC BLOOD PRESSURE: 90 MMHG | HEART RATE: 82 BPM | HEIGHT: 67 IN | OXYGEN SATURATION: 97 % | SYSTOLIC BLOOD PRESSURE: 138 MMHG | BODY MASS INDEX: 45.99 KG/M2 | WEIGHT: 293 LBS

## 2018-05-24 DIAGNOSIS — M79.89 PAIN AND SWELLING OF LEFT LOWER LEG: Primary | ICD-10-CM

## 2018-05-24 DIAGNOSIS — M79.662 PAIN AND SWELLING OF LEFT LOWER LEG: Primary | ICD-10-CM

## 2018-05-24 LAB
BH CV LOWER VASCULAR LEFT COMMON FEMORAL AUGMENT: NORMAL
BH CV LOWER VASCULAR LEFT COMMON FEMORAL COMPRESS: NORMAL
BH CV LOWER VASCULAR LEFT DISTAL FEMORAL AUGMENT: NORMAL
BH CV LOWER VASCULAR LEFT GREATER SAPH AK AUGMENT: NORMAL
BH CV LOWER VASCULAR LEFT GREATER SAPH AK COMPETENT: NORMAL
BH CV LOWER VASCULAR LEFT GREATER SAPH AK COMPRESS: NORMAL
BH CV LOWER VASCULAR LEFT GREATER SAPH BK AUGMENT: NORMAL
BH CV LOWER VASCULAR LEFT GREATER SAPH BK COMPETENT: NORMAL
BH CV LOWER VASCULAR LEFT GREATER SAPH BK COMPRESS: NORMAL
BH CV LOWER VASCULAR LEFT MID FEMORAL AUGMENT: NORMAL
BH CV LOWER VASCULAR LEFT POPLITEAL AUGMENT: NORMAL
BH CV LOWER VASCULAR LEFT POPLITEAL COMPRESS: NORMAL
BH CV LOWER VASCULAR LEFT POSTERIOR TIBIAL AUGMENT: NORMAL
BH CV LOWER VASCULAR LEFT PROFUNDA FEMORAL AUGMENT: NORMAL
BH CV LOWER VASCULAR LEFT PROXIMAL FEMORAL AUGMENT: NORMAL
BH CV LOWER VASCULAR LEFT PROXIMAL FEMORAL COMPRESS: NORMAL
BH CV LOWER VASCULAR RIGHT COMMON FEMORAL AUGMENT: NORMAL
BH CV LOWER VASCULAR RIGHT COMMON FEMORAL COMPETENT: NORMAL
BH CV LOWER VASCULAR RIGHT COMMON FEMORAL COMPRESS: NORMAL
BH CV LOWER VASCULAR RIGHT DISTAL FEMORAL AUGMENT: NORMAL
BH CV LOWER VASCULAR RIGHT GREATER SAPH AK AUGMENT: NORMAL
BH CV LOWER VASCULAR RIGHT GREATER SAPH AK COMPETENT: NORMAL
BH CV LOWER VASCULAR RIGHT GREATER SAPH AK COMPRESS: NORMAL
BH CV LOWER VASCULAR RIGHT GREATER SAPH BK AUGMENT: NORMAL
BH CV LOWER VASCULAR RIGHT GREATER SAPH BK COMPETENT: NORMAL
BH CV LOWER VASCULAR RIGHT GREATER SAPH BK COMPRESS: NORMAL
BH CV LOWER VASCULAR RIGHT MID FEMORAL AUGMENT: NORMAL
BH CV LOWER VASCULAR RIGHT POPLITEAL AUGMENT: NORMAL
BH CV LOWER VASCULAR RIGHT POPLITEAL COMPRESS: NORMAL
BH CV LOWER VASCULAR RIGHT POSTERIOR TIBIAL AUGMENT: NORMAL
BH CV LOWER VASCULAR RIGHT PROFUNDA FEMORAL AUGMENT: NORMAL
BH CV LOWER VASCULAR RIGHT PROXIMAL FEMORAL AUGMENT: NORMAL
BH CV LOWER VASCULAR RIGHT PROXIMAL FEMORAL COMPRESS: NORMAL

## 2018-05-24 PROCEDURE — 99213 OFFICE O/P EST LOW 20 MIN: CPT | Performed by: NURSE PRACTITIONER

## 2018-06-11 NOTE — PROGRESS NOTES
Subjective   Patient ID: Christen Casillas is a 48 y.o. female is being seen for consultation today at the request of [place]    Chief Complaint:    Chief Complaint   Patient presents with   • Deep Vein Thrombosis       History of Present Illness  The following portions of the patient's history were reviewed and updated as appropriate: allergies, current medications, past family history, past medical history, past social history, past surgical history and problem list.  Recent images independently reviewed.  Available laboratory values reviewed.  PCP: Andreina    48 yr old female referred for LEFT thigh pain. Further discussion she reports injury with car door in April associated pain and swelling of LEFT knee. She complains of pain specifically behind the left knee to the foot when dorsal flex of foot. It is difficult and painful to ambulate, currently using walker. Pain is exacerbated with weight-bearing, relieved by rest. She has seen multiple providers without clear diagnosis for her leg pain. She presents for vascular evaluation for possible DVT. VAS 7-Left Leg    5/24/18: LE Venous Duplex: Negative DVT      Past Medical History:   Diagnosis Date   • Depression    • Graves disease 10/24/2016   • Hypertension 10/24/2016   • SOB (shortness of breath)    • Type 2 diabetes mellitus without complication, without long-term current use of insulin 10/24/2016     Past Surgical History:   Procedure Laterality Date   • EXPLORATORY LAPAROTOMY     • TYMPANOPLASTY Left    • UMBILICAL HERNIA REPAIR N/A 3/31/2017    Procedure: UMBILICAL HERNIA REPAIR with mesh ;  Surgeon: Luke Chau MD;  Location: [facility];  Service:        ALLERGIES:   Honey bee treatment [bee venom]; Tapazole [methimazole]; and Aspirin    MEDICATIONS:      Current Outpatient Prescriptions:   •  acetaminophen (TYLENOL) 500 MG tablet, Take 500 mg by mouth Every 6 (Six) Hours As Needed for Mild Pain (1-3)., Disp: , Rfl:   •  albuterol  (PROVENTIL) (2.5 MG/3ML) 0.083% nebulizer solution, Take 2.5 mg by nebulization Every 4 (Four) Hours As Needed for Wheezing., Disp: , Rfl:   •  amLODIPine (NORVASC) 10 MG tablet, Take 10 mg by mouth., Disp: , Rfl:   •  budesonide-formoterol (SYMBICORT) 160-4.5 MCG/ACT inhaler, Inhale 2 puffs 2 (Two) Times a Day As Needed., Disp: , Rfl:   •  diphenhydrAMINE (BENADRYL) 25 mg capsule, Take 25 mg by mouth Every 6 (Six) Hours As Needed for Itching., Disp: , Rfl:   •  FLUoxetine (PROZAC) 10 MG capsule, TAKE 1 TABLET BY MOUTH EVERY DAY, Disp: , Rfl:   •  gabapentin (NEURONTIN) 300 MG capsule, Take 300 mg by mouth., Disp: , Rfl:   •  JANUVIA 100 MG tablet, TAKE 1 TABLET BY MOUTH DAILY., Disp: 30 tablet, Rfl: 5  •  lisinopril (PRINIVIL,ZESTRIL) 40 MG tablet, TAKE 1 TABLET BY MOUTH EVERY DAY, Disp: , Rfl:   •  metFORMIN (GLUCOPHAGE) 500 MG tablet, 1st week, then one breakfast and supper; (then two breakfast and supper)-maintenance dose, Disp: 60 tablet, Rfl: 5  •  metoprolol tartrate (LOPRESSOR) 25 MG tablet, Take 25 mg by mouth 2 (Two) Times a Day., Disp: , Rfl:     Review of Systems   Constitution: Negative for weakness.   Eyes: Negative for visual disturbance.   Cardiovascular: Positive for leg swelling. Negative for claudication and cyanosis.   Respiratory: Negative for shortness of breath.    Skin: Negative for color change and nail changes.   Musculoskeletal: Positive for joint pain. Negative for muscle weakness.   Gastrointestinal: Negative for dysphagia.   Neurological: Negative for focal weakness, light-headedness, loss of balance, numbness and paresthesias.   Psychiatric/Behavioral: Negative for altered mental status.   All other systems reviewed and are negative.       Objective       Vitals:    05/24/18 1018   BP: 138/90   Pulse: 82   SpO2: 97%       Body mass index is 46.99 kg/m².  Physical Exam   Constitutional: She is oriented to person, place, and time. She appears well-developed.   HENT:   Head:  Normocephalic.   Eyes: EOM are normal.   Neck: Neck supple. Carotid bruit is not present.   Cardiovascular: Normal rate and normal heart sounds.    Pulses:       Dorsalis pedis pulses are 2+ on the right side, and 2+ on the left side.        Posterior tibial pulses are 2+ on the right side, and 2+ on the left side.   Pulmonary/Chest: Effort normal and breath sounds normal. No respiratory distress.   Abdominal: Soft. Bowel sounds are normal. She exhibits no distension.   Musculoskeletal: She exhibits no edema (No significant edema noted between extremities).   Limited. Walker   Neurological: She is alert and oriented to person, place, and time.   Skin: Skin is warm and dry. Capillary refill takes less than 2 seconds.   Psychiatric: She has a normal mood and affect. Thought content normal.   Vitals reviewed.      Assessment/Plan   Independent Review of Radiographic Studies:    Detailed discussion regarding risks, benefits, and treatment plan. Images independently reviewed. Patient understands, agrees, and wishes to proceed with plan.     1. Pain and swelling of left lower leg  No evidence of DVT RIGHT or LEFT lower extremities  Compressibility of all vessels  Continue work up and evaluation with Orthopedics.   - Duplex Venous Lower Extremity - Bilateral CAR            This document has been electronically signed by NYLA Duke on June 11, 2018 4:31 PM

## 2018-10-08 RX ORDER — SITAGLIPTIN 100 MG/1
100 TABLET, FILM COATED ORAL DAILY
Qty: 30 TABLET | Refills: 5 | Status: SHIPPED | OUTPATIENT
Start: 2018-10-08 | End: 2019-04-15 | Stop reason: SDUPTHER

## 2019-04-15 RX ORDER — SITAGLIPTIN 100 MG/1
100 TABLET, FILM COATED ORAL DAILY
Qty: 30 TABLET | Refills: 5 | Status: SHIPPED | OUTPATIENT
Start: 2019-04-15 | End: 2019-09-24 | Stop reason: SDUPTHER

## 2019-07-26 ENCOUNTER — APPOINTMENT (OUTPATIENT)
Dept: LAB | Facility: HOSPITAL | Age: 49
End: 2019-07-26

## 2019-07-26 ENCOUNTER — OFFICE VISIT (OUTPATIENT)
Dept: ENDOCRINOLOGY | Facility: CLINIC | Age: 49
End: 2019-07-26

## 2019-07-26 VITALS
WEIGHT: 293 LBS | HEIGHT: 67 IN | DIASTOLIC BLOOD PRESSURE: 78 MMHG | BODY MASS INDEX: 45.99 KG/M2 | SYSTOLIC BLOOD PRESSURE: 136 MMHG | HEART RATE: 60 BPM

## 2019-07-26 DIAGNOSIS — E11.42 DIABETIC POLYNEUROPATHY ASSOCIATED WITH TYPE 2 DIABETES MELLITUS (HCC): ICD-10-CM

## 2019-07-26 DIAGNOSIS — E11.9 TYPE 2 DIABETES MELLITUS WITHOUT COMPLICATION, WITHOUT LONG-TERM CURRENT USE OF INSULIN (HCC): ICD-10-CM

## 2019-07-26 DIAGNOSIS — E05.00 GRAVES DISEASE: Primary | ICD-10-CM

## 2019-07-26 LAB
T3 SERPL-MCNC: 119 NG/DL (ref 80–200)
T4 FREE SERPL-MCNC: 0.73 NG/DL (ref 0.93–1.7)
TSH SERPL DL<=0.05 MIU/L-ACNC: 8.49 MIU/ML (ref 0.27–4.2)

## 2019-07-26 PROCEDURE — 84443 ASSAY THYROID STIM HORMONE: CPT | Performed by: NURSE PRACTITIONER

## 2019-07-26 PROCEDURE — 84480 ASSAY TRIIODOTHYRONINE (T3): CPT | Performed by: NURSE PRACTITIONER

## 2019-07-26 PROCEDURE — 99214 OFFICE O/P EST MOD 30 MIN: CPT | Performed by: NURSE PRACTITIONER

## 2019-07-26 PROCEDURE — 84439 ASSAY OF FREE THYROXINE: CPT | Performed by: NURSE PRACTITIONER

## 2019-07-26 PROCEDURE — 36415 COLL VENOUS BLD VENIPUNCTURE: CPT | Performed by: NURSE PRACTITIONER

## 2019-07-26 RX ORDER — PEN NEEDLE, DIABETIC 31 GX5/16"
NEEDLE, DISPOSABLE MISCELLANEOUS
Refills: 5 | COMMUNITY
Start: 2019-07-03

## 2019-07-26 NOTE — PROGRESS NOTES
Subjective    Christen Casillas is a 49 y.o. female. she is here today for follow-up.    History of Present Illness       Hyperthyroidism from Graves' Disease      CAPELLAN ablation on Feb. 23, 2017     Duration diagnosed in June 2016        Timing - improving     Location-she has an asymmetric right more than left sided goiter board thyroid uptake and scan shows diffuse 60% uptake. This was Reviewed by . . There is no thyroid ultrasound        Severity - at this point to mild     Complications - none        Current symptoms/problems  initially weight loss, fatigue, heat intolerance, increased bowel movements, tachycardia but now all improved     She also has goiter right, more than left. She states this has decreased in size after starting PTU     Alleviating Factors:  None      Side Effects developed respiratory difficulties with Tapazole    Lab Results   Component Value Date    TSH 0.350 (L) 02/26/2018             ---     Diabetes mellitus type 2     Lab Results   Component Value Date    HGBA1C 10.3 (H) 07/02/2019       Started on lantus this week       Now on metformin BId      Running 200                 The following portions of the patient's history were reviewed and updated as appropriate:   Past Medical History:   Diagnosis Date   • Depression    • Graves disease 10/24/2016   • Hypertension 10/24/2016   • SOB (shortness of breath)    • Type 2 diabetes mellitus without complication, without long-term current use of insulin (CMS/[facility]) 10/24/2016     Past Surgical History:   Procedure Laterality Date   • EXPLORATORY LAPAROTOMY     • TYMPANOPLASTY Left    • UMBILICAL HERNIA REPAIR N/A 3/31/2017    Procedure: UMBILICAL HERNIA REPAIR with mesh ;  Surgeon: Luke Chau MD;  Location: [facility];  Service:      Family History   Problem Relation Age of Onset   • Hypothyroidism Mother    • Skin cancer Mother    • Stomach cancer Brother    • Stomach cancer Maternal Aunt    • Breast cancer Paternal  Grandmother      OB History      Para Term  AB Living    0 0 0 0 0 0    SAB TAB Ectopic Molar Multiple Live Births    0 0 0   0          Current Outpatient Medications   Medication Sig Dispense Refill   • Insulin Glargine (LANTUS SOLOSTAR) 100 UNIT/ML injection pen Inject 10 Units under the skin into the appropriate area as directed.     • acetaminophen (TYLENOL) 500 MG tablet Take 500 mg by mouth Every 6 (Six) Hours As Needed for Mild Pain (1-3).     • albuterol (PROVENTIL) (2.5 MG/3ML) 0.083% nebulizer solution Take 2.5 mg by nebulization Every 4 (Four) Hours As Needed for Wheezing.     • B-D ULTRAFINE III SHORT PEN 31G X 8 MM misc USE WITH INSULIN EVERY DAY AS DIRECTED  5   • budesonide-formoterol (SYMBICORT) 160-4.5 MCG/ACT inhaler Inhale 2 puffs 2 (Two) Times a Day As Needed.     • diphenhydrAMINE (BENADRYL) 25 mg capsule Take 25 mg by mouth Every 6 (Six) Hours As Needed for Itching.     • FLUoxetine (PROZAC) 10 MG capsule TAKE 1 TABLET BY MOUTH EVERY DAY     • gabapentin (NEURONTIN) 300 MG capsule Take 300 mg by mouth.     • JANUVIA 100 MG tablet TAKE 1 TABLET BY MOUTH DAILY. 30 tablet 5   • lisinopril (PRINIVIL,ZESTRIL) 40 MG tablet TAKE 1 TABLET BY MOUTH EVERY DAY     • metFORMIN (GLUCOPHAGE) 500 MG tablet TAKE 2 TABLETS BY MOUTH TWO (2) TIMES A  tablet 0   • metoprolol tartrate (LOPRESSOR) 25 MG tablet Take 25 mg by mouth 2 (Two) Times a Day.       No current facility-administered medications for this visit.      Allergies   Allergen Reactions   • Honey Bee Treatment [Bee Venom] Anaphylaxis   • Tapazole [Methimazole] Shortness Of Breath   • Aspirin Hives     also shortness of breath       Social History     Socioeconomic History   • Marital status:      Spouse name: Timbo   • Number of children: 0   • Years of education: 12   • Highest education level: Not on file   Occupational History   • Occupation: food prep   Tobacco Use   • Smoking status: Former Smoker     Packs/day: 0.50  "    Years: 31.00     Pack years: 15.50     Types: Cigarettes     Last attempt to quit: 2011     Years since quittin.9   • Smokeless tobacco: Never Used   Substance and Sexual Activity   • Alcohol use: No   • Drug use: No   • Sexual activity: Defer     Partners: Male       Review of Systems  Review of Systems   Constitutional: Negative for activity change, appetite change, diaphoresis and fatigue.   HENT: Negative for facial swelling, sneezing, sore throat, tinnitus, trouble swallowing and voice change.    Eyes: Negative for photophobia, pain, discharge, redness, itching and visual disturbance.   Respiratory: Negative for apnea, cough, choking, chest tightness and shortness of breath.    Cardiovascular: Negative for chest pain, palpitations and leg swelling.   Gastrointestinal: Negative for abdominal distention, abdominal pain, constipation, diarrhea, nausea and vomiting.   Endocrine: Negative for cold intolerance, heat intolerance, polydipsia, polyphagia and polyuria.   Genitourinary: Negative for difficulty urinating, dysuria, frequency, hematuria and urgency.   Musculoskeletal: Negative for arthralgias, back pain, gait problem, joint swelling, myalgias, neck pain and neck stiffness.   Skin: Negative for color change, pallor, rash and wound.   Neurological: Negative for dizziness, tremors, weakness, light-headedness, numbness and headaches.   Hematological: Negative for adenopathy. Does not bruise/bleed easily.   Psychiatric/Behavioral: Negative for behavioral problems, confusion and sleep disturbance.        Objective    /78 (BP Location: Left arm, Patient Position: Sitting, Cuff Size: Adult)   Pulse 60   Ht 170.2 cm (67\")   Wt 136 kg (300 lb)   LMP 2016 (Exact Date)   BMI 46.99 kg/m²   Physical Exam   Constitutional: She is oriented to person, place, and time. She appears well-developed and well-nourished. No distress.   HENT:   Head: Normocephalic and atraumatic.   Right Ear: External ear " normal.   Left Ear: External ear normal.   Nose: Nose normal.   Eyes: Conjunctivae and EOM are normal. Pupils are equal, round, and reactive to light.   Neck: Normal range of motion. Neck supple. No tracheal deviation present. Thyromegaly present.   Cardiovascular: Normal rate, regular rhythm and normal heart sounds.   No murmur heard.  Pulmonary/Chest: Effort normal and breath sounds normal. No respiratory distress. She has no wheezes.   Abdominal: Soft. Bowel sounds are normal. There is no tenderness. There is no rebound and no guarding.   Musculoskeletal: Normal range of motion. She exhibits no edema, tenderness or deformity.   In wheelchair    Neurological: She is alert and oriented to person, place, and time. No cranial nerve deficit.   Skin: Skin is warm and dry. No rash noted.   Psychiatric: She has a normal mood and affect. Her behavior is normal. Judgment and thought content normal.       Lab Review  Glucose (mg/dL)   Date Value   02/26/2018 257 (H)   11/21/2017 195 (H)   08/01/2017 187 (H)     Sodium (mmol/L)   Date Value   02/26/2018 139   11/21/2017 137   08/01/2017 136 (L)     Potassium (mmol/L)   Date Value   02/26/2018 4.4   11/21/2017 4.7   08/01/2017 5.1     Chloride (mmol/L)   Date Value   02/26/2018 97   11/21/2017 97   08/01/2017 98     CO2 (mmol/L)   Date Value   02/26/2018 26.0   11/21/2017 28.0   08/01/2017 30.0     BUN (mg/dL)   Date Value   02/26/2018 9   11/21/2017 15   08/01/2017 15     Creatinine (mg/dL)   Date Value   02/26/2018 0.59   11/21/2017 0.88   08/01/2017 0.70     Hemoglobin A1C (%)   Date Value   07/02/2019 10.3 (H)   02/26/2018 8.5 (H)   11/21/2017 7.7 (H)   06/07/2017 7.36 (H)     Triglycerides   Date Value   07/02/2019 187 mg/dL (H)   06/07/2016 128 mg/dl     LDL Cholesterol  (mg/dL)   Date Value   07/02/2019 187 (H)     LDLCALC ELECT (mg/dl)   Date Value   06/07/2016 69       Assessment/Plan      1. Graves disease    2. Type 2 diabetes mellitus without complication, without  long-term current use of insulin (CMS/[facility])    3. Diabetic polyneuropathy associated with type 2 diabetes mellitus (CMS/[facility])    .    Medications prescribed:  Outpatient Encounter Medications as of 7/26/2019   Medication Sig Dispense Refill   • Insulin Glargine (LANTUS SOLOSTAR) 100 UNIT/ML injection pen Inject 10 Units under the skin into the appropriate area as directed.     • acetaminophen (TYLENOL) 500 MG tablet Take 500 mg by mouth Every 6 (Six) Hours As Needed for Mild Pain (1-3).     • albuterol (PROVENTIL) (2.5 MG/3ML) 0.083% nebulizer solution Take 2.5 mg by nebulization Every 4 (Four) Hours As Needed for Wheezing.     • B-D ULTRAFINE III SHORT PEN 31G X 8 MM misc USE WITH INSULIN EVERY DAY AS DIRECTED  5   • budesonide-formoterol (SYMBICORT) 160-4.5 MCG/ACT inhaler Inhale 2 puffs 2 (Two) Times a Day As Needed.     • diphenhydrAMINE (BENADRYL) 25 mg capsule Take 25 mg by mouth Every 6 (Six) Hours As Needed for Itching.     • FLUoxetine (PROZAC) 10 MG capsule TAKE 1 TABLET BY MOUTH EVERY DAY     • gabapentin (NEURONTIN) 300 MG capsule Take 300 mg by mouth.     • JANUVIA 100 MG tablet TAKE 1 TABLET BY MOUTH DAILY. 30 tablet 5   • lisinopril (PRINIVIL,ZESTRIL) 40 MG tablet TAKE 1 TABLET BY MOUTH EVERY DAY     • metFORMIN (GLUCOPHAGE) 500 MG tablet TAKE 2 TABLETS BY MOUTH TWO (2) TIMES A  tablet 0   • metoprolol tartrate (LOPRESSOR) 25 MG tablet Take 25 mg by mouth 2 (Two) Times a Day.       No facility-administered encounter medications on file as of 7/26/2019.        Orders placed during this encounter include:  Orders Placed This Encounter   Procedures   • TSH   • T4, Free   • T3     Blood Pressure Management:   Nl on lisinopril  Bronchospasm w metoprolol         Microvascular Complication Monitoring:      Neuropathy - on Neurontin 300 mg      --     Immunizations:            Preventive Care:      Non smoker     Weight Related:   Advised to consume 500 calories less per day  Exercise 30 min daily       Bone Health               Lab Results   Component Value Date     CALCIUM 9.6 09/19/2016         Thyroid Health            Lab Results   Component Value Date     TSH <0.01 (L) 08/16/2016      Graves Disease        Component  Latest Ref Rng 6/7/2016 8/16/2016   TSH Baseline  0.46 - 4.68 uIU/ml <0.01 (L) <0.01 (L)   Free T4  0.78 - 2.19 ng/dl   5.69 (H)   T3, Total  97 - 169 ng/dl   781 (H)   Thyrotropin Receptor Antibody  0.00 - 1.75 IU/L   11.71 (H)      dx is Graves' disease     PTU 2 tabs three times daily      Labs from Oct. 2016     TSH - <0.01  Free T4- 1.01  T3- 209     Decreased PTU to 2 tablets BID --now one TID      Continue metopolol 25 mg twice daily      Need new labs today and will call      Schedule for Radioactive Iodine. After Radioactive Iodine is given there is a transient worsening phase so 5 days after CAPELLAN restart PTU for about 2 weeks then stop      We will check levels 4 weeks after CAPELLAN , will leave a lab order     Don't stop metoprolol unless heart rate drops less than 60   On the contrary if heart rate speeds up to more than 100 while resting - double it .     will make appt w Dr. Vela and with us in about 8 weeks.     CAPELLAN ablation on Feb. 23, 2017      She started back on the PTU on March 1, 2017 will stop tomorrow     Has been off PTU for one month                Lab Results   Component Value Date     TSH <0.020 (L) 04/26/2017     [id number] 307.0 (H) 04/26/2017     THYROIDAB 149 (H) 10/24/2016                   Lab Results   Component Value Date     TSH <0.020 (L) 06/07/2017     [id number] 265.0 (H) 06/07/2017     THYROIDAB 149 (H) 10/24/2016       did not start medication will wait today and see what labs are showing                Lab Results   Component Value Date     TSH <0.020 (L) 08/01/2017     [id number] 202.0 (H) 08/01/2017     THYROIDAB 149 (H) 10/24/2016      Component      Latest Ref Rng & Units 8/1/2017   Free T4      0.78 - 2.19 ng/dL 1.42      Still off PTU not considered failure  at this time                   Lab Results   Component Value Date     TSH <0.020 (L) 09/26/2017     [id number] 198.0 (H) 09/26/2017     THYROIDAB 149 (H) 10/24/2016         Component      Latest Ref Rng & Units 9/26/2017   Free T4      0.78 - 2.19 ng/dL 1.47            New labs today           Other Diabetes Related Aspects               No results found for: [id number]            Glycemic Management          Lab Results   Component Value Date    HGBA1C 10.3 (H) 07/02/2019          Metformin 1000 mg po BID     Lantus taking 15 units       Januvia 100 mg daily     I suggest stopping Januvia and starting Trulicity 0.75 she wants to wait         start B12 in the formal multivitamins daily     Has neuropathy and this could be related to either thyroid, diabetes or b12 Deficiency.     She is taking Neurontin     Hypertension     Norvasc 10 mg one daily        Weight management      Patient's Body mass index is 46.99 kg/m². BMI is above normal parameters. Recommendations include: educational material.       Dietary changes      Diet handout on diabetes and nutrition                 4. Follow-up: Return in about 6 months (around 1/26/2020) for Recheck.

## 2019-07-29 ENCOUNTER — TELEPHONE (OUTPATIENT)
Dept: ENDOCRINOLOGY | Facility: CLINIC | Age: 49
End: 2019-07-29

## 2019-07-29 DIAGNOSIS — E89.0 POSTABLATIVE HYPOTHYROIDISM: Primary | ICD-10-CM

## 2019-07-29 RX ORDER — LEVOTHYROXINE SODIUM 25 UG/1
25 CAPSULE ORAL DAILY
Qty: 30 CAPSULE | Refills: 5 | Status: SHIPPED | OUTPATIENT
Start: 2019-07-29 | End: 2020-01-21

## 2019-07-29 NOTE — TELEPHONE ENCOUNTER
----- Message from NYLA Gibbs sent at 7/29/2019  7:56 AM CDT -----  She has converted to hypothyroidism since the CAPELLAN ablation; start levothryoxine 25 mcg one daily -- one hour before all food and only with water; labs in 2 months I will put in an order

## 2019-08-13 ENCOUNTER — TRANSCRIBE ORDERS (OUTPATIENT)
Dept: PODIATRY | Facility: CLINIC | Age: 49
End: 2019-08-13

## 2019-08-13 DIAGNOSIS — L30.9 DERMATITIS: ICD-10-CM

## 2019-08-13 DIAGNOSIS — M79.672 LEFT FOOT PAIN: Primary | ICD-10-CM

## 2019-09-04 ENCOUNTER — OFFICE VISIT (OUTPATIENT)
Dept: PODIATRY | Facility: CLINIC | Age: 49
End: 2019-09-04

## 2019-09-04 VITALS — WEIGHT: 293 LBS | HEART RATE: 71 BPM | BODY MASS INDEX: 45.99 KG/M2 | OXYGEN SATURATION: 92 % | HEIGHT: 67 IN

## 2019-09-04 DIAGNOSIS — M79.674 CHRONIC TOE PAIN, BILATERAL: ICD-10-CM

## 2019-09-04 DIAGNOSIS — E11.42 TYPE 2 DIABETES MELLITUS WITH PERIPHERAL NEUROPATHY (HCC): ICD-10-CM

## 2019-09-04 DIAGNOSIS — L03.031 PARONYCHIA OF GREAT TOE OF RIGHT FOOT: ICD-10-CM

## 2019-09-04 DIAGNOSIS — B35.1 ONYCHOMYCOSIS: ICD-10-CM

## 2019-09-04 DIAGNOSIS — G89.29 CHRONIC PAIN IN LEFT FOOT: Primary | ICD-10-CM

## 2019-09-04 DIAGNOSIS — R09.89 DECREASED PEDAL PULSES: ICD-10-CM

## 2019-09-04 DIAGNOSIS — G89.29 CHRONIC TOE PAIN, BILATERAL: ICD-10-CM

## 2019-09-04 DIAGNOSIS — R60.0 LOWER EXTREMITY EDEMA: ICD-10-CM

## 2019-09-04 DIAGNOSIS — M79.675 CHRONIC TOE PAIN, BILATERAL: ICD-10-CM

## 2019-09-04 DIAGNOSIS — M79.672 CHRONIC PAIN IN LEFT FOOT: Primary | ICD-10-CM

## 2019-09-04 PROCEDURE — 99204 OFFICE O/P NEW MOD 45 MIN: CPT | Performed by: PODIATRIST

## 2019-09-04 PROCEDURE — 11730 AVULSION NAIL PLATE SIMPLE 1: CPT | Performed by: PODIATRIST

## 2019-09-04 NOTE — PROGRESS NOTES
Christen Casillas  1970  49 y.o. female   PCP: Dr. Barajas 7/23/19  BS: 197 per patient     Patient came to clinic with pain in her right foot states her pain is 7/10 and been having pain in her right foot since October. 9/4/19 09/04/2019    Chief Complaint   Patient presents with   • Left Foot - Pain       History of Present Illness    Christen Casillas is a 49 y.o.female who presents to clinic today with several pedal complaints.  Her chief complaint is of left lower extremity pain and swelling since April 2018.  Patient relates to an injury to her knee with subsequent left lower extremity pain and swelling.  Pain and swelling has been constant with no resolution.  She rates her pain as a 7 out of 10.  She states that in actuality both of her legs are swollen but the left is worse than the right.  She is taking Lasix every other day which helps little.  She rates her pain as a 7 out of 10.  She also complains of an ingrowing toenail on her right great toe.  Issue started approximately 6 weeks ago.  She denies any injury to the great toe.  She is on Keflex.  She admits to being a type II diabetic.  Her blood sugars are not well controlled.  She relates to numbness and tingling in both feet.  She has no other pedal complaints today.      Past Medical History:   Diagnosis Date   • Depression    • Diabetes (CMS/HCC)    • Family history of thyroid problem    • Graves disease 10/24/2016   • High blood pressure    • Hypertension 10/24/2016   • SOB (shortness of breath)    • Type 2 diabetes mellitus without complication, without long-term current use of insulin (CMS/HCC) 10/24/2016         Past Surgical History:   Procedure Laterality Date   • EXPLORATORY LAPAROTOMY     • TYMPANOPLASTY Left    • UMBILICAL HERNIA REPAIR N/A 3/31/2017    Procedure: UMBILICAL HERNIA REPAIR with mesh ;  Surgeon: Luke Chau MD;  Location: [facility] OR;  Service:          Family History   Problem Relation Age of Onset    • Hypothyroidism Mother    • Skin cancer Mother    • Cancer Mother    • Diabetes Mother    • Hypertension Mother    • Stomach cancer Brother    • Cancer Brother    • Diabetes Brother    • Hypertension Brother    • Stomach cancer Maternal Aunt    • Cancer Maternal Aunt    • Diabetes Maternal Aunt    • Breast cancer Paternal Grandmother    • Cancer Paternal Grandmother    • Psoriasis Father    • Diabetes Father    • Hypertension Sister    • Cancer Maternal Uncle    • Diabetes Maternal Uncle        Allergies   Allergen Reactions   • Honey Bee Treatment [Bee Venom] Anaphylaxis   • Tapazole [Methimazole] Shortness Of Breath   • Aspirin Hives     also shortness of breath         Social History     Socioeconomic History   • Marital status:      Spouse name: Timbo   • Number of children: 0   • Years of education: 12   • Highest education level: Not on file   Occupational History   • Occupation: food prep   Tobacco Use   • Smoking status: Former Smoker     Packs/day: 0.50     Years: 31.00     Pack years: 15.50     Types: Cigarettes     Last attempt to quit: 2011     Years since quittin.0   • Smokeless tobacco: Never Used   Substance and Sexual Activity   • Alcohol use: No   • Drug use: No   • Sexual activity: Defer     Partners: Male         Current Outpatient Medications   Medication Sig Dispense Refill   • acetaminophen (TYLENOL) 500 MG tablet Take 500 mg by mouth Every 6 (Six) Hours As Needed for Mild Pain (1-3).     • albuterol (PROVENTIL) (2.5 MG/3ML) 0.083% nebulizer solution Take 2.5 mg by nebulization Every 4 (Four) Hours As Needed for Wheezing.     • B-D ULTRAFINE III SHORT PEN 31G X 8 MM misc USE WITH INSULIN EVERY DAY AS DIRECTED  5   • budesonide-formoterol (SYMBICORT) 160-4.5 MCG/ACT inhaler Inhale 2 puffs 2 (Two) Times a Day As Needed.     • diphenhydrAMINE (BENADRYL) 25 mg capsule Take 25 mg by mouth Every 6 (Six) Hours As Needed for Itching.     • FLUoxetine (PROZAC) 10 MG capsule TAKE 1 TABLET  "BY MOUTH EVERY DAY     • gabapentin (NEURONTIN) 300 MG capsule Take 300 mg by mouth.     • Insulin Glargine (LANTUS SOLOSTAR) 100 UNIT/ML injection pen Inject 10 Units under the skin into the appropriate area as directed.     • JANUVIA 100 MG tablet TAKE 1 TABLET BY MOUTH DAILY. 30 tablet 5   • levothyroxine sodium (TIROSINT) 25 MCG capsule Take 1 capsule by mouth Daily. 30 capsule 5   • lisinopril (PRINIVIL,ZESTRIL) 40 MG tablet TAKE 1 TABLET BY MOUTH EVERY DAY     • metFORMIN (GLUCOPHAGE) 500 MG tablet TAKE 2 TABLETS BY MOUTH TWO (2) TIMES A  tablet 5   • metoprolol tartrate (LOPRESSOR) 25 MG tablet Take 25 mg by mouth 2 (Two) Times a Day.       No current facility-administered medications for this visit.        Review of Systems   Constitutional: Positive for fatigue.   HENT: Negative.    Eyes: Negative.    Respiratory: Negative.    Cardiovascular: Positive for leg swelling. Negative for chest pain.   Gastrointestinal: Negative.    Endocrine: Negative.    Genitourinary: Negative.    Musculoskeletal:        Foot pain and ankle pain    Skin: Negative for wound.        Dry skin  Ingrown right toenail   Allergic/Immunologic: Negative.    Neurological:        Numbness    Hematological: Negative.    Psychiatric/Behavioral:        Depression          OBJECTIVE    Pulse 71   Ht 170.2 cm (67\")   Wt 136 kg (300 lb)   LMP 02/01/2016 (Exact Date)   SpO2 92%   BMI 46.99 kg/m²       Physical Exam   Constitutional: She is oriented to person, place, and time. She appears well-developed and well-nourished. No distress.   HENT:   Head: Normocephalic and atraumatic.   Nose: Nose normal.   Eyes: Conjunctivae and EOM are normal. Pupils are equal, round, and reactive to light.   Neck: Normal range of motion. No tracheal deviation present.   Pulmonary/Chest: Effort normal. No respiratory distress. She has no wheezes.   Musculoskeletal: Normal range of motion. She exhibits edema and tenderness. She exhibits no deformity.    " Christen had a diabetic foot exam performed today.   During the foot exam she had a monofilament test performed.  Neurological: She is alert and oriented to person, place, and time.   Skin: Skin is warm and dry. Capillary refill takes less than 2 seconds.   Psychiatric: She has a normal mood and affect. Her behavior is normal.   Vitals reviewed.      Gait: antalgic    Assistive Device: wheelchair    Lower Extremity    Cardiovascular:    DP/PT pulses non-palpable bilateral, + doppler signals  CFT intact to all digits  Skin temp is warm to warm from proximal tibia to distal digits bilateral  Pedal hair growth absent.   Edema noted to bilateral lower extremity's. L>R  Musculoskeletal:  Muscle strength is 4/5 for all muscle groups tested   ROM of the 1st MTP is WNL bilateral   ROM of the ankle joint is  WNL bilateral   Diffuse pain on palpation to the entire left lower extremity.  Dermatological:   Skin is diffusely dry bilateral  Webspaces 1-4 bilateral are filled with debris  No subcutaneous nodules or masses noted    Toenails 1 through 5 bilateral are thickened, discolored, elongated with subungual debris.  Pain on palpation to the nail plates.  Right hallux nail is incurvated and ingrowing on the medial border.  There is erythema and edema with mild drainage.  Significant tenderness to palpation.  Neurological:   Protective sensation absent  Sensation intact to light touch        Nail Removal  Date/Time: 9/4/2019 12:55 PM  Performed by: Rory Enciso DPM  Authorized by: Rory Enciso DPM   Consent: Verbal consent obtained. Written consent obtained.  Risks and benefits: risks, benefits and alternatives were discussed  Consent given by: patient  Patient identity confirmed: verbally with patient  Location: right foot  Location details: right big toe  Anesthesia: digital block    Anesthesia:  Local Anesthetic: lidocaine 2% without epinephrine    Sedation:  Patient sedated: no    Preparation: skin prepped with  Betadine  Amount removed: complete (Nail plate was  from the underlying nail bed with blunt dissection and removed with hemostat.)  Nail matrix removed: none  Dressing: antibiotic ointment and dressing applied  Patient tolerance: Patient tolerated the procedure well with no immediate complications              ASSESSMENT AND PLAN    Christen was seen today for pain.    Diagnoses and all orders for this visit:    Chronic pain in left foot  -     XR Foot 3+ View Left    Decreased pedal pulses  -     Doppler Ankle Brachial Index Single Level CAR; Future    Type 2 diabetes mellitus with peripheral neuropathy (CMS/HCC)    Lower extremity edema    Paronychia of great toe of right foot    Onychomycosis    Chronic toe pain, bilateral    Other orders  -     Nail Removal      - Comprehensive foot and ankle exam performed  - A diabetic foot screening exam was performed and the patient was educated on the foot complications related to diabetes,  preventative foot care and what signs and symptoms to watch for.  Instructed to contact our office if any foot problems develop before next visit.  -X-rays taken and reviewed left foot.  No acute osseous abnormalities noted.  - Diagnosis, prevention and treatment of ingrown toenails discussed with patient, including risks and potential benefits of nail avulsion both temporary and permanent versus simple debridement.  - Patient elected for a total temporary nail avulsion  - Dispensed aftercare instruction sheet  - Order noninvasive vascular testing.  Will initiate compression therapy for edema control following evaluation of patient's peripheral circulation.  - All questions were answered and the patient is in agreement with the current treatment plan.  - RTC in 2 weeks           This document has been electronically signed by Rory Enciso DPM on September 4, 2019 12:56 PM     9/4/2019  12:56 PM

## 2019-09-18 ENCOUNTER — OFFICE VISIT (OUTPATIENT)
Dept: PODIATRY | Facility: CLINIC | Age: 49
End: 2019-09-18

## 2019-09-18 VITALS — WEIGHT: 293 LBS | BODY MASS INDEX: 45.99 KG/M2 | HEIGHT: 67 IN | HEART RATE: 57 BPM | OXYGEN SATURATION: 90 %

## 2019-09-18 DIAGNOSIS — R60.0 LOWER EXTREMITY EDEMA: Primary | ICD-10-CM

## 2019-09-18 DIAGNOSIS — L03.031 PARONYCHIA OF GREAT TOE OF RIGHT FOOT: ICD-10-CM

## 2019-09-18 PROCEDURE — 99212 OFFICE O/P EST SF 10 MIN: CPT | Performed by: PODIATRIST

## 2019-09-18 NOTE — PROGRESS NOTES
Christen Casillas  1970  49 y.o. female   KARLA Admundson 7/23/19  BS - 248 per patient     Patient presents today for recheck of her right great toenail avulsion.    09/18/2019     Chief Complaint   Patient presents with   • Right Foot - Follow-up       History of Present Illness    Christen Casillas is a 49 y.o.female who presents to clinic for follow-up of her right great toenail avulsion.  She has been soaking and dressing as instructed.  She denies pain to the great toe.  She has had her noninvasive vascular studies.  Still complains of lower extremity swelling.  No other complaints today.      Past Medical History:   Diagnosis Date   • Depression    • Diabetes (CMS/HCC)    • Family history of thyroid problem    • Graves disease 10/24/2016   • High blood pressure    • Hypertension 10/24/2016   • Ingrown toenail    • SOB (shortness of breath)    • Type 2 diabetes mellitus without complication, without long-term current use of insulin (CMS/HCC) 10/24/2016         Past Surgical History:   Procedure Laterality Date   • EXPLORATORY LAPAROTOMY     • TYMPANOPLASTY Left    • UMBILICAL HERNIA REPAIR N/A 3/31/2017    Procedure: UMBILICAL HERNIA REPAIR with mesh ;  Surgeon: Luke Chau MD;  Location: [facility];  Service:          Family History   Problem Relation Age of Onset   • Hypothyroidism Mother    • Skin cancer Mother    • Cancer Mother    • Diabetes Mother    • Hypertension Mother    • Stomach cancer Brother    • Cancer Brother    • Diabetes Brother    • Hypertension Brother    • Stomach cancer Maternal Aunt    • Cancer Maternal Aunt    • Diabetes Maternal Aunt    • Breast cancer Paternal Grandmother    • Cancer Paternal Grandmother    • Psoriasis Father    • Diabetes Father    • Hypertension Sister    • Cancer Maternal Uncle    • Diabetes Maternal Uncle        Allergies   Allergen Reactions   • Honey Bee Treatment [Bee Venom] Anaphylaxis   • Tapazole [Methimazole] Shortness Of Breath   •  Aspirin Hives     also shortness of breath         Social History     Socioeconomic History   • Marital status:      Spouse name: Timbo   • Number of children: 0   • Years of education: 12   • Highest education level: Not on file   Occupational History   • Occupation: food prep   Tobacco Use   • Smoking status: Former Smoker     Packs/day: 0.50     Years: 31.00     Pack years: 15.50     Types: Cigarettes     Last attempt to quit: 2011     Years since quittin.0   • Smokeless tobacco: Never Used   Substance and Sexual Activity   • Alcohol use: No   • Drug use: No   • Sexual activity: Defer     Partners: Male         Current Outpatient Medications   Medication Sig Dispense Refill   • acetaminophen (TYLENOL) 500 MG tablet Take 500 mg by mouth Every 6 (Six) Hours As Needed for Mild Pain (1-3).     • albuterol (PROVENTIL) (2.5 MG/3ML) 0.083% nebulizer solution Take 2.5 mg by nebulization Every 4 (Four) Hours As Needed for Wheezing.     • B-D ULTRAFINE III SHORT PEN 31G X 8 MM misc USE WITH INSULIN EVERY DAY AS DIRECTED  5   • budesonide-formoterol (SYMBICORT) 160-4.5 MCG/ACT inhaler Inhale 2 puffs 2 (Two) Times a Day As Needed.     • diphenhydrAMINE (BENADRYL) 25 mg capsule Take 25 mg by mouth Every 6 (Six) Hours As Needed for Itching.     • FLUoxetine (PROZAC) 10 MG capsule TAKE 1 TABLET BY MOUTH EVERY DAY     • gabapentin (NEURONTIN) 300 MG capsule Take 300 mg by mouth.     • Insulin Glargine (LANTUS SOLOSTAR) 100 UNIT/ML injection pen Inject 10 Units under the skin into the appropriate area as directed.     • JANUVIA 100 MG tablet TAKE 1 TABLET BY MOUTH DAILY. 30 tablet 5   • levothyroxine sodium (TIROSINT) 25 MCG capsule Take 1 capsule by mouth Daily. 30 capsule 5   • lisinopril (PRINIVIL,ZESTRIL) 40 MG tablet TAKE 1 TABLET BY MOUTH EVERY DAY     • metFORMIN (GLUCOPHAGE) 500 MG tablet TAKE 2 TABLETS BY MOUTH TWO (2) TIMES A  tablet 5   • metoprolol tartrate (LOPRESSOR) 25 MG tablet Take 25 mg by  "mouth 2 (Two) Times a Day.       No current facility-administered medications for this visit.        Review of Systems   Constitutional: Positive for fatigue.   HENT: Negative.    Eyes: Negative.    Respiratory: Negative.    Cardiovascular: Positive for leg swelling.   Gastrointestinal: Negative.    Musculoskeletal:        Foot pain and ankle pain    Skin:        Dry skin     Neurological:        Numbness    Psychiatric/Behavioral: Negative.          OBJECTIVE    Pulse 57   Ht 170.2 cm (67\")   Wt 136 kg (300 lb)   LMP 02/01/2016 (Exact Date)   SpO2 90%   BMI 46.99 kg/m²       Physical Exam   Constitutional: She is oriented to person, place, and time. She appears well-developed and well-nourished. No distress.   HENT:   Head: Normocephalic and atraumatic.   Eyes: EOM are normal. Pupils are equal, round, and reactive to light.   Pulmonary/Chest: Effort normal. No respiratory distress.   Neurological: She is alert and oriented to person, place, and time.   Psychiatric: She has a normal mood and affect. Her behavior is normal.   Vitals reviewed.      Gait: antalgic    Assistive Device: wheelchair    Lower Extremity    Cardiovascular:    DP/PT pulses non-palpable bilateral, + doppler signals  CFT intact to all digits  Skin temp is warm to warm from proximal tibia to distal digits bilateral  Pedal hair growth absent.   Edema noted to bilateral lower extremity's. L>R  Musculoskeletal:  Muscle strength is 4/5 for all muscle groups tested   ROM of the 1st MTP is WNL bilateral   ROM of the ankle joint is  WNL bilateral   Dermatological:   Skin is diffusely dry bilateral  Webspaces 1-4 bilateral are filled with debris  No subcutaneous nodules or masses noted    Right hallux nail is absent.  No signs of infection.   Neurological:   Protective sensation absent  Sensation intact to light touch        Procedures        ASSESSMENT AND PLAN    Christen was seen today for follow-up.    Diagnoses and all orders for this " visit:    Lower extremity edema    Paronychia of great toe of right foot      -Patient is doing well following right hallux nail procedure.  The toe until there is no drainage.  - Reviewed noninvasive vascular studies. Normal arterial perfusion to bilateral lower extremities.  Dispensed JENNIFER hose.  Will increase compression as needed.  - All questions were answered and the patient is in agreement with the current treatment plan.  - RTC in 3 months          This document has been electronically signed by Rory Enciso DPM on September 18, 2019 11:51 AM     9/18/2019  11:51 AM

## 2019-09-24 RX ORDER — SITAGLIPTIN 100 MG/1
100 TABLET, FILM COATED ORAL DAILY
Qty: 30 TABLET | Refills: 5 | Status: SHIPPED | OUTPATIENT
Start: 2019-09-24 | End: 2020-03-19 | Stop reason: SDUPTHER

## 2020-01-21 RX ORDER — LEVOTHYROXINE SODIUM 0.03 MG/1
TABLET ORAL
Qty: 30 TABLET | Refills: 0 | Status: SHIPPED | OUTPATIENT
Start: 2020-01-21 | End: 2020-02-19

## 2020-02-19 RX ORDER — LEVOTHYROXINE SODIUM 0.03 MG/1
TABLET ORAL
Qty: 30 TABLET | Refills: 0 | Status: SHIPPED | OUTPATIENT
Start: 2020-02-19 | End: 2020-03-19 | Stop reason: SDUPTHER

## 2020-03-19 ENCOUNTER — TELEPHONE (OUTPATIENT)
Dept: ENDOCRINOLOGY | Facility: CLINIC | Age: 50
End: 2020-03-19

## 2020-03-19 RX ORDER — LEVOTHYROXINE SODIUM 0.03 MG/1
25 TABLET ORAL DAILY
Qty: 30 TABLET | Refills: 0 | Status: SHIPPED | OUTPATIENT
Start: 2020-03-19 | End: 2020-04-15

## 2020-03-19 NOTE — TELEPHONE ENCOUNTER
PT HAS A FEVER, DIARRHEA, VOMITING.  PT IS WANTING TO KNOW IF SHE SHOULD RESCHEDULE HER APPT TOMORROW AND WHEN YOU WOULD LIKE TO HER TO RESCHEDULE?   SHE WILL NEED HER THYROID MEDS REFILLED IF YOU WANT HER TO RESCHEDULE.  [facility]   [phone]

## 2020-04-15 RX ORDER — LEVOTHYROXINE SODIUM 0.03 MG/1
25 TABLET ORAL DAILY
Qty: 30 TABLET | Refills: 0 | Status: SHIPPED | OUTPATIENT
Start: 2020-04-15 | End: 2020-05-20

## 2020-05-20 RX ORDER — LEVOTHYROXINE SODIUM 0.03 MG/1
25 TABLET ORAL DAILY
Qty: 30 TABLET | Refills: 0 | Status: SHIPPED | OUTPATIENT
Start: 2020-05-20

## 2020-07-09 RX ORDER — SITAGLIPTIN 100 MG/1
TABLET, FILM COATED ORAL
Qty: 30 TABLET | Refills: 4 | OUTPATIENT
Start: 2020-07-09

## 2020-07-09 RX ORDER — LEVOTHYROXINE SODIUM 0.03 MG/1
25 TABLET ORAL DAILY
Qty: 30 TABLET | Refills: 0 | OUTPATIENT
Start: 2020-07-09

## 2020-08-17 RX ORDER — LEVOTHYROXINE SODIUM 0.03 MG/1
25 TABLET ORAL DAILY
Qty: 30 TABLET | Refills: 0 | OUTPATIENT
Start: 2020-08-17

## 2020-09-02 RX ORDER — SITAGLIPTIN 100 MG/1
TABLET, FILM COATED ORAL
Qty: 30 TABLET | Refills: 4 | OUTPATIENT
Start: 2020-09-02

## 2020-09-15 RX ORDER — LEVOTHYROXINE SODIUM 0.03 MG/1
25 TABLET ORAL DAILY
Qty: 30 TABLET | Refills: 0 | OUTPATIENT
Start: 2020-09-15

## (undated) DEVICE — SUT NUROLON 2 0 CT1 18IN CR8 C522D

## (undated) DEVICE — PK MAJ PROC LF 60

## (undated) DEVICE — GLV SURG SENSICARE GREEN W/ALOE PF LF 6 STRL

## (undated) DEVICE — SUT VIC 3/0 SH 27IN J416H

## (undated) DEVICE — GLV SURG SENSICARE ALOE LF PF SZ7.5 GRN

## (undated) DEVICE — GLV SURG SENSICARE GREEN W/ALOE PF LF 7 STRL

## (undated) DEVICE — GOWN SURG PREVENTION PLUS IMPERV XLNG 2XL

## (undated) DEVICE — GLV SURG TRIUMPH LT PF LTX 7.5 STRL

## (undated) DEVICE — SOL IRR NACL 0.9PCT BT 1000ML

## (undated) DEVICE — GLV SURG TRIUMPH LT PF LTX 7 STRL

## (undated) DEVICE — GLV SURG TRIUMPH LT PF LTX 6.5 STRL

## (undated) DEVICE — SUT MONOCRYL 4/0 PS2 27IN Y426H ETY426H

## (undated) DEVICE — GLV SURG TRIUMPH LT PF LTX 6 STRL

## (undated) DEVICE — SKIN AFFIX SURG ADHESIVE 72/CS 0.55ML: Brand: MEDLINE